# Patient Record
Sex: FEMALE | Race: WHITE | NOT HISPANIC OR LATINO | Employment: OTHER | URBAN - METROPOLITAN AREA
[De-identification: names, ages, dates, MRNs, and addresses within clinical notes are randomized per-mention and may not be internally consistent; named-entity substitution may affect disease eponyms.]

---

## 2017-01-11 ENCOUNTER — ANESTHESIA EVENT (OUTPATIENT)
Dept: GASTROENTEROLOGY | Facility: AMBULARY SURGERY CENTER | Age: 62
End: 2017-01-11
Payer: COMMERCIAL

## 2017-01-12 ENCOUNTER — HOSPITAL ENCOUNTER (OUTPATIENT)
Facility: AMBULARY SURGERY CENTER | Age: 62
Setting detail: OUTPATIENT SURGERY
Discharge: HOME | End: 2017-01-12
Attending: INTERNAL MEDICINE | Admitting: INTERNAL MEDICINE
Payer: COMMERCIAL

## 2017-01-12 ENCOUNTER — ANESTHESIA (OUTPATIENT)
Dept: GASTROENTEROLOGY | Facility: AMBULARY SURGERY CENTER | Age: 62
End: 2017-01-12
Payer: COMMERCIAL

## 2017-01-12 VITALS
OXYGEN SATURATION: 96 % | TEMPERATURE: 97.3 F | RESPIRATION RATE: 18 BRPM | HEART RATE: 61 BPM | SYSTOLIC BLOOD PRESSURE: 99 MMHG | DIASTOLIC BLOOD PRESSURE: 58 MMHG

## 2017-01-12 DIAGNOSIS — K21.9 GASTRO-ESOPHAGEAL REFLUX DISEASE WITHOUT ESOPHAGITIS: ICD-10-CM

## 2017-01-12 DIAGNOSIS — Z86.010 HISTORY OF COLONIC POLYPS: ICD-10-CM

## 2017-01-12 PROCEDURE — 88305 TISSUE EXAM BY PATHOLOGIST: CPT | Performed by: INTERNAL MEDICINE

## 2017-01-12 RX ORDER — PROPOFOL 10 MG/ML
INJECTION, EMULSION INTRAVENOUS AS NEEDED
Status: DISCONTINUED | OUTPATIENT
Start: 2017-01-12 | End: 2017-01-12 | Stop reason: SURG

## 2017-01-12 RX ORDER — SODIUM CHLORIDE, SODIUM LACTATE, POTASSIUM CHLORIDE, CALCIUM CHLORIDE 600; 310; 30; 20 MG/100ML; MG/100ML; MG/100ML; MG/100ML
125 INJECTION, SOLUTION INTRAVENOUS CONTINUOUS
Status: DISCONTINUED | OUTPATIENT
Start: 2017-01-12 | End: 2017-01-12 | Stop reason: HOSPADM

## 2017-01-12 RX ADMIN — PROPOFOL 25 MG: 10 INJECTION, EMULSION INTRAVENOUS at 08:19

## 2017-01-12 RX ADMIN — SODIUM CHLORIDE, SODIUM LACTATE, POTASSIUM CHLORIDE, AND CALCIUM CHLORIDE: .6; .31; .03; .02 INJECTION, SOLUTION INTRAVENOUS at 07:51

## 2017-01-12 RX ADMIN — SODIUM CHLORIDE, SODIUM LACTATE, POTASSIUM CHLORIDE, AND CALCIUM CHLORIDE 125 ML/HR: .6; .31; .03; .02 INJECTION, SOLUTION INTRAVENOUS at 07:29

## 2017-01-12 RX ADMIN — PROPOFOL 50 MG: 10 INJECTION, EMULSION INTRAVENOUS at 08:09

## 2017-01-12 RX ADMIN — PROPOFOL 25 MG: 10 INJECTION, EMULSION INTRAVENOUS at 08:26

## 2017-01-12 RX ADMIN — PROPOFOL 50 MG: 10 INJECTION, EMULSION INTRAVENOUS at 08:02

## 2018-12-31 ENCOUNTER — TRANSCRIBE ORDERS (OUTPATIENT)
Dept: ADMINISTRATIVE | Facility: HOSPITAL | Age: 63
End: 2018-12-31

## 2018-12-31 ENCOUNTER — HOSPITAL ENCOUNTER (OUTPATIENT)
Dept: RADIOLOGY | Facility: HOSPITAL | Age: 63
Discharge: HOME | End: 2018-12-31
Attending: FAMILY MEDICINE
Payer: COMMERCIAL

## 2018-12-31 ENCOUNTER — HOSPITAL ENCOUNTER (OUTPATIENT)
Dept: RADIOLOGY | Facility: HOSPITAL | Age: 63
Discharge: HOME | End: 2018-12-31
Payer: COMMERCIAL

## 2018-12-31 DIAGNOSIS — M25.50 PAIN IN JOINT, MULTIPLE SITES: Primary | ICD-10-CM

## 2018-12-31 DIAGNOSIS — M25.50 PAIN IN JOINT, MULTIPLE SITES: ICD-10-CM

## 2018-12-31 PROCEDURE — 73000 X-RAY EXAM OF COLLAR BONE: CPT

## 2019-01-11 ENCOUNTER — OFFICE VISIT (OUTPATIENT)
Dept: OBGYN CLINIC | Facility: CLINIC | Age: 64
End: 2019-01-11
Payer: COMMERCIAL

## 2019-01-11 VITALS
BODY MASS INDEX: 26.76 KG/M2 | HEIGHT: 62 IN | HEART RATE: 73 BPM | WEIGHT: 145.4 LBS | DIASTOLIC BLOOD PRESSURE: 64 MMHG | SYSTOLIC BLOOD PRESSURE: 100 MMHG

## 2019-01-11 DIAGNOSIS — S29.011A STRAIN OF RIGHT PECTORALIS MUSCLE, INITIAL ENCOUNTER: Primary | ICD-10-CM

## 2019-01-11 PROCEDURE — 99203 OFFICE O/P NEW LOW 30 MIN: CPT | Performed by: ORTHOPAEDIC SURGERY

## 2019-01-11 NOTE — PROGRESS NOTES
Assessment/Plan:  1  Strain of left pectoralis muscle, initial encounter     - reviewed with patient that she does not have any significant arthritic findings on her x-ray  - reviewed that this is musculature in nature, and she does not have any palpable masses to suggest further imaging needs  --- will consider MRI if she develops any masses  - patient can continue with full activities as tolerated  - recommend that she use OTC anti-inflammatory medications  - follow up PRN    Subjective:   Poly Sanchez is a 59 y o  female who presents for evaluation of right clavicle prominence and pain with certain movements  Patient states that the pain is more in the proximal area of the clavicle, and can be intermittently painful to palpation and can be painful with some shoulder ROM  Patient denies any trauma to the area  She denies any inciting injury/accident  Patient is unaware of how long this has been a problem  She has intermittent tingling down bilateral upper extremities, which is unchanged and not related to the pain  Patient denies any other complaints  Patient does a lot of physical activities and manual labor, including throwing hay jos  Review of Systems   Constitutional: Negative for fever and unexpected weight change  HENT: Negative for hearing loss, nosebleeds and sore throat  Eyes: Negative for pain, redness and visual disturbance  Respiratory: Negative for cough, shortness of breath and wheezing  Cardiovascular: Negative for chest pain, palpitations and leg swelling  Gastrointestinal: Negative for abdominal pain, nausea and vomiting  Endocrine: Negative for polydipsia and polyuria  Genitourinary: Negative for dysuria and hematuria  Skin: Negative for rash and wound  Neurological: Negative for dizziness and headaches  Psychiatric/Behavioral: Negative for agitation and suicidal ideas           Past Medical History:   Diagnosis Date    Acid reflux        Past Surgical History: Procedure Laterality Date    BREAST SURGERY  1975    lumpectomy, benign    COLONOSCOPY N/A 1/12/2017    Procedure: COLONOSCOPY;  Surgeon: Juan Miranda MD;  Location: [facility]; Service:     ESOPHAGOGASTRODUODENOSCOPY N/A 1/12/2017    Procedure: ESOPHAGOGASTRODUODENOSCOPY (EGD); Surgeon: Juan Miranda MD;  Location: [facility]; Service:     FRACTURE SURGERY      nose    GASTRIC BYPASS  2011    HYSTERECTOMY      JOINT REPLACEMENT Left     knee    ID CORRJ HALLUX VALGUS W/SESMDC W/DIST METAR OSTEOT Right 1/11/2016    Procedure: Tinnie Grebe with Internal Fixation Right Foot;  Surgeon: Helena Thomas DPM;  Location: [facility];  Service: Podiatry    ROOSEVELT-EN-Y PROCEDURE      TONSILLECTOMY         Family History   Problem Relation Age of Onset    COPD Mother     COPD Father     COPD Brother     Diabetes Maternal Grandmother     Cancer Paternal Grandmother         ovarian    No Known Problems Sister     No Known Problems Maternal Aunt     No Known Problems Maternal Uncle     No Known Problems Paternal Aunt     No Known Problems Paternal Uncle     No Known Problems Maternal Grandfather     No Known Problems Paternal Grandfather     ADD / ADHD Neg Hx     Anesthesia problems Neg Hx     Clotting disorder Neg Hx     Collagen disease Neg Hx     Dislocations Neg Hx     Learning disabilities Neg Hx     Neurological problems Neg Hx     Osteoporosis Neg Hx     Rheumatologic disease Neg Hx     Scoliosis Neg Hx     Vascular Disease Neg Hx        Social History     Occupational History    Not on file  Social History Main Topics    Smoking status: Never Smoker    Smokeless tobacco: Never Used    Alcohol use Yes      Comment: socially    Drug use: No    Sexual activity: Not on file         Current Outpatient Prescriptions:     CALCIUM PO, Take by mouth, Disp: , Rfl:     cyanocobalamin (VITAMIN B-12) 500 mcg tablet, Take 500 mcg by mouth daily  , Disp: , Rfl:    multivitamins-fortified A-D-K (SOURCECF) solution, Take 0 5 mL by mouth daily  , Disp: , Rfl:     VITAMIN D, ERGOCALCIFEROL, PO, Take by mouth, Disp: , Rfl:     Allergies   Allergen Reactions    Nsaids Itching    Oxycodone-Acetaminophen Itching    Celecoxib     Meloxicam        Objective:  Vitals:    01/11/19 1323   BP: 100/64   Pulse: 73       Left Shoulder Exam     Tenderness   The patient is experiencing no tenderness (no palpable masses)  Range of Motion   Active Abduction: 150   Passive Abduction: 170   Forward Flexion: normal   External Rotation: normal   Internal Rotation 0 degrees: Mid thoracic   Internal Rotation 90 degrees: normal     Muscle Strength   The patient has normal left shoulder strength  Tests   Drop Arm: negative  Impingement: negative    Other   Sensation: normal  Pulse: present     Comments:  Patient has discomfort with resisted adduction           Physical Exam   Constitutional: She is oriented to person, place, and time  She appears well-developed and well-nourished  HENT:   Head: Normocephalic and atraumatic  Eyes: Pupils are equal, round, and reactive to light  Neck: Neck supple  No tracheal deviation present  Cardiovascular: Intact distal pulses  Pulmonary/Chest: Breath sounds normal    Abdominal: There is no guarding  Neurological: She is alert and oriented to person, place, and time  Skin: Skin is warm and dry  No rash noted  Psychiatric: She has a normal mood and affect   Her behavior is normal        I have personally reviewed pertinent films in PACS and my interpretation is as follows:  Normal presentation of the right clavicle

## 2019-01-11 NOTE — LETTER
January 11, 2019     Starla Persaud MD  [facility] [id number]    Patient: Namita Cerna   YOB: 1955   Date of Visit: 1/11/2019       Dear Dr Josh Townsend: Thank you for referring Rosey Thompson to me for evaluation  Below are my notes for this consultation  If you have questions, please do not hesitate to call me  I look forward to following your patient along with you  Sincerely,        Starla Persaud MD        CC: DO Celestina Wright PA-C  1/11/2019  2:24 PM  Cosign Needed  Assessment/Plan:  1  Strain of left pectoralis muscle, initial encounter     - reviewed with patient that she does not have any significant arthritic findings on her x-ray  - reviewed that this is musculature in nature, and she does not have any palpable masses to suggest further imaging needs  --- will consider MRI if she develops any masses  - patient can continue with full activities as tolerated  - recommend that she use OTC anti-inflammatory medications  - follow up PRN    Subjective:   Namita Cerna is a 59 y o  female who presents for evaluation of right clavicle prominence and pain with certain movements  Patient states that the pain is more in the proximal area of the clavicle, and can be intermittently painful to palpation and can be painful with some shoulder ROM  Patient denies any trauma to the area  She denies any inciting injury/accident  Patient is unaware of how long this has been a problem  She has intermittent tingling down bilateral upper extremities, which is unchanged and not related to the pain  Patient denies any other complaints  Patient does a lot of physical activities and manual labor, including throwing hay jos  Review of Systems   Constitutional: Negative for fever and unexpected weight change  HENT: Negative for hearing loss, nosebleeds and sore throat  Eyes: Negative for pain, redness and visual disturbance  Respiratory: Negative for cough, shortness of breath and wheezing  Cardiovascular: Negative for chest pain, palpitations and leg swelling  Gastrointestinal: Negative for abdominal pain, nausea and vomiting  Endocrine: Negative for polydipsia and polyuria  Genitourinary: Negative for dysuria and hematuria  Skin: Negative for rash and wound  Neurological: Negative for dizziness and headaches  Psychiatric/Behavioral: Negative for agitation and suicidal ideas  Past Medical History:   Diagnosis Date    Acid reflux        Past Surgical History:   Procedure Laterality Date    BREAST SURGERY  1975    lumpectomy, benign    COLONOSCOPY N/A 1/12/2017    Procedure: COLONOSCOPY;  Surgeon: Jose Villegas MD;  Location: [facility]; Service:     ESOPHAGOGASTRODUODENOSCOPY N/A 1/12/2017    Procedure: ESOPHAGOGASTRODUODENOSCOPY (EGD); Surgeon: Jose Villegas MD;  Location: [facility];   Service:     FRACTURE SURGERY      nose    GASTRIC BYPASS  2011    HYSTERECTOMY      JOINT REPLACEMENT Left     knee    GA CORRJ HALLUX VALGUS W/SESMDC W/DIST METAR OSTEOT Right 1/11/2016    Procedure: Gabby Slipper with Internal Fixation Right Foot;  Surgeon: Pinky Gallagher DPM;  Location: [facility];  Service: Podiatry    ROOSEVELT-EN-Y PROCEDURE      TONSILLECTOMY         Family History   Problem Relation Age of Onset    COPD Mother     COPD Father     COPD Brother     Diabetes Maternal Grandmother     Cancer Paternal Grandmother         ovarian    No Known Problems Sister     No Known Problems Maternal Aunt     No Known Problems Maternal Uncle     No Known Problems Paternal Aunt     No Known Problems Paternal Uncle     No Known Problems Maternal Grandfather     No Known Problems Paternal Grandfather     ADD / ADHD Neg Hx     Anesthesia problems Neg Hx     Clotting disorder Neg Hx     Collagen disease Neg Hx     Dislocations Neg Hx     Learning disabilities Neg Hx     Neurological problems Neg Hx     Osteoporosis Neg Hx     Rheumatologic disease Neg Hx     Scoliosis Neg Hx     Vascular Disease Neg Hx        Social History     Occupational History    Not on file  Social History Main Topics    Smoking status: Never Smoker    Smokeless tobacco: Never Used    Alcohol use Yes      Comment: socially    Drug use: No    Sexual activity: Not on file         Current Outpatient Prescriptions:     CALCIUM PO, Take by mouth, Disp: , Rfl:     cyanocobalamin (VITAMIN B-12) 500 mcg tablet, Take 500 mcg by mouth daily  , Disp: , Rfl:     multivitamins-fortified A-D-K (SOURCECF) solution, Take 0 5 mL by mouth daily  , Disp: , Rfl:     VITAMIN D, ERGOCALCIFEROL, PO, Take by mouth, Disp: , Rfl:     Allergies   Allergen Reactions    Nsaids Itching    Oxycodone-Acetaminophen Itching    Celecoxib     Meloxicam        Objective:  Vitals:    01/11/19 1323   BP: 100/64   Pulse: 73       Left Shoulder Exam     Tenderness   The patient is experiencing no tenderness (no palpable masses)  Range of Motion   Active Abduction: 150   Passive Abduction: 170   Forward Flexion: normal   External Rotation: normal   Internal Rotation 0 degrees: Mid thoracic   Internal Rotation 90 degrees: normal     Muscle Strength   The patient has normal left shoulder strength  Tests   Drop Arm: negative  Impingement: negative    Other   Sensation: normal  Pulse: present     Comments:  Patient has discomfort with resisted adduction           Physical Exam   Constitutional: She is oriented to person, place, and time  She appears well-developed and well-nourished  HENT:   Head: Normocephalic and atraumatic  Eyes: Pupils are equal, round, and reactive to light  Neck: Neck supple  No tracheal deviation present  Cardiovascular: Intact distal pulses  Pulmonary/Chest: Breath sounds normal    Abdominal: There is no guarding  Neurological: She is alert and oriented to person, place, and time     Skin: Skin is warm and dry  No rash noted  Psychiatric: She has a normal mood and affect   Her behavior is normal        I have personally reviewed pertinent films in PACS and my interpretation is as follows:  Normal presentation of the right clavicle

## 2020-08-21 DIAGNOSIS — Z20.822 COVID-19 RULED OUT BY LABORATORY TESTING: ICD-10-CM

## 2020-08-21 PROCEDURE — U0003 INFECTIOUS AGENT DETECTION BY NUCLEIC ACID (DNA OR RNA); SEVERE ACUTE RESPIRATORY SYNDROME CORONAVIRUS 2 (SARS-COV-2) (CORONAVIRUS DISEASE [COVID-19]), AMPLIFIED PROBE TECHNIQUE, MAKING USE OF HIGH THROUGHPUT TECHNOLOGIES AS DESCRIBED BY CMS-2020-01-R: HCPCS | Performed by: INTERNAL MEDICINE

## 2020-08-22 LAB — SARS-COV-2 RNA SPEC QL NAA+PROBE: NOT DETECTED

## 2020-08-25 RX ORDER — FAMOTIDINE 20 MG/1
20 TABLET, FILM COATED ORAL DAILY PRN
COMMUNITY
End: 2022-05-27

## 2020-08-25 NOTE — PRE-PROCEDURE INSTRUCTIONS
Pre-Surgery Instructions:   Medication Instructions    CALCIUM PO Patient was instructed by Physician and understands   cyanocobalamin (VITAMIN B-12) 500 mcg tablet Patient was instructed by Physician and understands   famotidine (PEPCID) 20 mg tablet Patient was instructed by Physician and understands   multivitamins-fortified A-D-K (SOURCECF) solution Patient was instructed by Physician and understands   VITAMIN D, ERGOCALCIFEROL, PO Patient was instructed by Physician and understands

## 2020-08-26 ENCOUNTER — ANESTHESIA EVENT (OUTPATIENT)
Dept: GASTROENTEROLOGY | Facility: AMBULARY SURGERY CENTER | Age: 65
End: 2020-08-26

## 2020-08-26 NOTE — ANESTHESIA PREPROCEDURE EVALUATION
Procedure:  COLONOSCOPY    Relevant Problems   No relevant active problems        Physical Exam    Airway    Mallampati score: II  TM Distance: >3 FB  Neck ROM: full     Dental   No notable dental hx     Cardiovascular  Rhythm: regular, Rate: normal, Cardiovascular exam normal    Pulmonary  Pulmonary exam normal Breath sounds clear to auscultation,     Other Findings        Anesthesia Plan  ASA Score- 2     Anesthesia Type- IV sedation with anesthesia with ASA Monitors  Additional Monitors:   Airway Plan:           Plan Factors-Exercise tolerance (METS): >4 METS  Chart reviewed  Patient is not a current smoker  Induction- intravenous  Postoperative Plan- Plan for postoperative opioid use  Informed Consent- Anesthetic plan and risks discussed with patient  I personally reviewed this patient with the CRNA  Discussed and agreed on the Anesthesia Plan with the CRNA  Jose Angel Keith

## 2020-08-27 ENCOUNTER — HOSPITAL ENCOUNTER (OUTPATIENT)
Dept: GASTROENTEROLOGY | Facility: AMBULARY SURGERY CENTER | Age: 65
Setting detail: OUTPATIENT SURGERY
Discharge: HOME | End: 2020-08-27
Attending: INTERNAL MEDICINE | Admitting: INTERNAL MEDICINE
Payer: COMMERCIAL

## 2020-08-27 ENCOUNTER — ANESTHESIA (OUTPATIENT)
Dept: GASTROENTEROLOGY | Facility: AMBULARY SURGERY CENTER | Age: 65
End: 2020-08-27

## 2020-08-27 VITALS
HEIGHT: 62 IN | RESPIRATION RATE: 16 BRPM | OXYGEN SATURATION: 100 % | TEMPERATURE: 97.6 F | HEART RATE: 79 BPM | DIASTOLIC BLOOD PRESSURE: 56 MMHG | WEIGHT: 145 LBS | BODY MASS INDEX: 26.68 KG/M2 | SYSTOLIC BLOOD PRESSURE: 108 MMHG

## 2020-08-27 DIAGNOSIS — Z20.822 COVID-19 RULED OUT BY LABORATORY TESTING: Primary | ICD-10-CM

## 2020-08-27 DIAGNOSIS — Z86.010 PERSONAL HISTORY OF COLONIC POLYPS: ICD-10-CM

## 2020-08-27 PROCEDURE — 88305 TISSUE EXAM BY PATHOLOGIST: CPT | Performed by: PATHOLOGY

## 2020-08-27 PROCEDURE — 87635 SARS-COV-2 COVID-19 AMP PRB: CPT

## 2020-08-27 RX ORDER — SODIUM CHLORIDE, SODIUM LACTATE, POTASSIUM CHLORIDE, CALCIUM CHLORIDE 600; 310; 30; 20 MG/100ML; MG/100ML; MG/100ML; MG/100ML
125 INJECTION, SOLUTION INTRAVENOUS CONTINUOUS
Status: DISCONTINUED | OUTPATIENT
Start: 2020-08-27 | End: 2020-08-31 | Stop reason: HOSPADM

## 2020-08-27 RX ORDER — PROPOFOL 10 MG/ML
INJECTION, EMULSION INTRAVENOUS AS NEEDED
Status: DISCONTINUED | OUTPATIENT
Start: 2020-08-27 | End: 2020-08-27

## 2020-08-27 RX ADMIN — PROPOFOL 100 MG: 10 INJECTION, EMULSION INTRAVENOUS at 07:43

## 2020-08-27 RX ADMIN — SODIUM CHLORIDE, SODIUM LACTATE, POTASSIUM CHLORIDE, AND CALCIUM CHLORIDE 125 ML/HR: .6; .31; .03; .02 INJECTION, SOLUTION INTRAVENOUS at 07:38

## 2020-08-27 RX ADMIN — PROPOFOL 100 MG: 10 INJECTION, EMULSION INTRAVENOUS at 07:45

## 2020-08-27 RX ADMIN — PROPOFOL 100 MG: 10 INJECTION, EMULSION INTRAVENOUS at 07:47

## 2020-08-27 RX ADMIN — PROPOFOL 100 MG: 10 INJECTION, EMULSION INTRAVENOUS at 07:55

## 2020-08-27 RX ADMIN — PROPOFOL 150 MG: 10 INJECTION, EMULSION INTRAVENOUS at 07:50

## 2020-08-27 NOTE — DISCHARGE SUMMARY
Call my office for biopsy results in 2 weeks  Repeat colonoscopy in 10 years for colon cancer screening    If any GI symptoms then evaluation accordingly

## 2020-08-27 NOTE — ANESTHESIA POSTPROCEDURE EVALUATION
Post-Op Assessment Note    CV Status:  Stable       Mental Status:  Sleepy   Hydration Status:  Stable   PONV Controlled:  Controlled   Airway Patency:  Patent      Post Op Vitals Reviewed: Yes      Staff: CRNA         No complications documented      BP   109/56   Temp      Pulse  78   Resp      SpO2   100

## 2020-08-27 NOTE — PERIOPERATIVE NURSING NOTE
Pt d/c to home at this time [place]   Via: Walking  Pt left with all belongings  Iv was D/C intact with dry sterile dressing  Encouraged to keep follow up appointments, Verbalized understanding  D/C instructions reviewed and explained  Verbalized understanding

## 2020-08-27 NOTE — H&P
History and Physical - [facility]  Shyann Lovett 62 y o  female MRN: [id number]                  HPI: Shyann Lovett is a 62y o  year old female who presents for open access colonoscopy for follow-up of polyps last colonoscopy more than 3 years ago      REVIEW OF SYSTEMS: Per the HPI, and otherwise unremarkable  Historical Information   Past Medical History:   Diagnosis Date    Acid reflux     GERD (gastroesophageal reflux disease)      Past Surgical History:   Procedure Laterality Date    BREAST SURGERY Left 1975    lumpectomy, benign    COLONOSCOPY N/A 1/12/2017    Procedure: COLONOSCOPY;  Surgeon: Cherie Carballo MD;  Location: [place] [address] GI LAB; Service:     ESOPHAGOGASTRODUODENOSCOPY N/A 1/12/2017    Procedure: ESOPHAGOGASTRODUODENOSCOPY (EGD); Surgeon: Cherie Carballo MD;  Location: [place] [address] GI LAB;   Service:     FRACTURE SURGERY      nose    GASTRIC BYPASS  2011    HYSTERECTOMY      JOINT REPLACEMENT Left     knee    UT CORRJ HALLUX VALGUS W/SESMDC W/DIST METAR OSTEOT Right 1/11/2016    Procedure: Allison Wilson with Internal Fixation Right Foot;  Surgeon: Shannan Brooke DPM;  Location: [facility] OR;  Service: Podiatry    ROOSEVELT-EN-Y PROCEDURE      TONSILLECTOMY       Social History   Social History     Substance and Sexual Activity   Alcohol Use Yes    Frequency: 2-4 times a month    Comment: socially     Social History     Substance and Sexual Activity   Drug Use No     Social History     Tobacco Use   Smoking Status Never Smoker   Smokeless Tobacco Never Used     Family History   Problem Relation Age of Onset    COPD Mother     COPD Father     COPD Brother     Diabetes Maternal Grandmother     Cancer Paternal Grandmother         ovarian    No Known Problems Sister     No Known Problems Maternal Aunt     No Known Problems Maternal Uncle     No Known Problems Paternal Aunt     No Known Problems Paternal Uncle     No Known Problems Maternal Grandfather     No Known Problems Paternal Grandfather     ADD / ADHD Neg Hx     Anesthesia problems Neg Hx     Clotting disorder Neg Hx     Collagen disease Neg Hx     Dislocations Neg Hx     Learning disabilities Neg Hx     Neurological problems Neg Hx     Osteoporosis Neg Hx     Rheumatologic disease Neg Hx     Scoliosis Neg Hx     Vascular Disease Neg Hx        Meds/Allergies     (Not in a hospital admission)      Allergies   Allergen Reactions    Nsaids Itching    Oxycodone-Acetaminophen Itching    Celecoxib     Meloxicam        Objective     /76   Pulse 73   Temp 97 6 °F (36 4 °C) (Tympanic)   Resp 18   Ht 5' 1 75" (1 568 m)   Wt 65 8 kg (145 lb)   SpO2 99%   BMI 26 74 kg/m²       PHYSICAL EXAM    Gen: NAD  CV: RRR  CHEST: Clear  ABD: soft, NT/ND  EXT: no edema      ASSESSMENT/PLAN:  This is a 62y o  year old female here for follow-up of polyps, last colonoscopy more than 3 years ago and she is stable and optimized for her procedure

## 2020-09-21 ENCOUNTER — HOSPITAL ENCOUNTER (OUTPATIENT)
Dept: RADIOLOGY | Facility: HOSPITAL | Age: 65
Discharge: HOME | End: 2020-09-21
Attending: FAMILY MEDICINE
Payer: COMMERCIAL

## 2020-09-21 ENCOUNTER — TRANSCRIBE ORDERS (OUTPATIENT)
Dept: ADMINISTRATIVE | Facility: HOSPITAL | Age: 65
End: 2020-09-21

## 2020-09-21 DIAGNOSIS — M54.2 CERVICALGIA: Primary | ICD-10-CM

## 2020-09-21 DIAGNOSIS — M54.2 CERVICALGIA: ICD-10-CM

## 2020-09-21 PROCEDURE — 72040 X-RAY EXAM NECK SPINE 2-3 VW: CPT

## 2020-11-18 ENCOUNTER — TELEPHONE (OUTPATIENT)
Dept: OBGYN CLINIC | Facility: HOSPITAL | Age: 65
End: 2020-11-18

## 2020-11-20 ENCOUNTER — APPOINTMENT (EMERGENCY)
Dept: RADIOLOGY | Facility: HOSPITAL | Age: 65
End: 2020-11-20
Payer: COMMERCIAL

## 2020-11-20 ENCOUNTER — HOSPITAL ENCOUNTER (EMERGENCY)
Facility: HOSPITAL | Age: 65
Discharge: HOME | End: 2020-11-20
Attending: EMERGENCY MEDICINE | Admitting: EMERGENCY MEDICINE
Payer: COMMERCIAL

## 2020-11-20 VITALS
SYSTOLIC BLOOD PRESSURE: 124 MMHG | OXYGEN SATURATION: 99 % | RESPIRATION RATE: 16 BRPM | DIASTOLIC BLOOD PRESSURE: 61 MMHG | TEMPERATURE: 98.6 F | HEART RATE: 84 BPM

## 2020-11-20 DIAGNOSIS — M25.775 OSTEOPHYTE OF LEFT FOOT: ICD-10-CM

## 2020-11-20 DIAGNOSIS — L03.119 CELLULITIS OF FOOT: Primary | ICD-10-CM

## 2020-11-20 PROCEDURE — 99284 EMERGENCY DEPT VISIT MOD MDM: CPT | Performed by: PHYSICIAN ASSISTANT

## 2020-11-20 PROCEDURE — 73630 X-RAY EXAM OF FOOT: CPT

## 2020-11-20 PROCEDURE — 99283 EMERGENCY DEPT VISIT LOW MDM: CPT

## 2020-11-20 RX ORDER — CEPHALEXIN 500 MG/1
500 CAPSULE ORAL 2 TIMES DAILY
Qty: 20 CAPSULE | Refills: 0 | Status: SHIPPED | OUTPATIENT
Start: 2020-11-20 | End: 2020-11-30

## 2021-08-10 ENCOUNTER — OFFICE VISIT (OUTPATIENT)
Dept: OBGYN CLINIC | Facility: CLINIC | Age: 66
End: 2021-08-10
Payer: COMMERCIAL

## 2021-08-10 VITALS
HEART RATE: 73 BPM | BODY MASS INDEX: 29 KG/M2 | SYSTOLIC BLOOD PRESSURE: 117 MMHG | DIASTOLIC BLOOD PRESSURE: 73 MMHG | WEIGHT: 153.6 LBS | HEIGHT: 61 IN

## 2021-08-10 DIAGNOSIS — G56.03 CARPAL TUNNEL SYNDROME, BILATERAL: ICD-10-CM

## 2021-08-10 DIAGNOSIS — M65.311 TRIGGER THUMB OF RIGHT HAND: Primary | ICD-10-CM

## 2021-08-10 PROCEDURE — 99213 OFFICE O/P EST LOW 20 MIN: CPT | Performed by: ORTHOPAEDIC SURGERY

## 2021-08-10 PROCEDURE — 20550 NJX 1 TENDON SHEATH/LIGAMENT: CPT | Performed by: ORTHOPAEDIC SURGERY

## 2021-08-10 RX ORDER — LIDOCAINE HYDROCHLORIDE 10 MG/ML
0.5 INJECTION, SOLUTION INFILTRATION; PERINEURAL
Status: COMPLETED | OUTPATIENT
Start: 2021-08-10 | End: 2021-08-10

## 2021-08-10 RX ORDER — TRIAMCINOLONE ACETONIDE 40 MG/ML
20 INJECTION, SUSPENSION INTRA-ARTICULAR; INTRAMUSCULAR
Status: COMPLETED | OUTPATIENT
Start: 2021-08-10 | End: 2021-08-10

## 2021-08-10 RX ADMIN — LIDOCAINE HYDROCHLORIDE 0.5 ML: 10 INJECTION, SOLUTION INFILTRATION; PERINEURAL at 08:14

## 2021-08-10 RX ADMIN — TRIAMCINOLONE ACETONIDE 20 MG: 40 INJECTION, SUSPENSION INTRA-ARTICULAR; INTRAMUSCULAR at 08:14

## 2021-08-10 NOTE — PROGRESS NOTES
Assessment/Plan:  1  Trigger thumb of right hand     2  Carpal tunnel syndrome, bilateral         Scribe Attestation    I,:  Layo Horvath am acting as a scribe while in the presence of the attending physician :       I,:  Marva Rivera, DO personally performed the services described in this documentation    as scribed in my presence :         Nelson Rodarte is a pleasant 60year old who presents to the office today for an initial evaluation of her right thumb  Upon evaluation today she has findings consistent with right thumb trigger finger and bilateral carpal tunnel syndrome  The patient notes that the numbness and tingling into her hands is not as bothersome as the pain into her right thumb  I discussed with the patient we will continue to monitor the numbness and tingling she is experiencing into her bilateral hands  Non-operative treatments were discussed with the patient in the forms of a corticosteroid injection for the trigger thumb  We discussed the risks and benefits of corticosteroid injection today in the office and she did elect to proceed  The right thumb A1 pulley injection was administered without issue and well tolerated by the patient  Post injection instructions were provided  She understands can be repeated no sooner than three months  I will follow-up with her in 3 months for a clinical re-evaluation  She understood and had no further questions  Subjective:   Patient ID: Hortensia Valentino is a 60 y o  female who presents to the office today for an initial evaluation of her right thumb  She states in April 2021 she noticed pain and locking into her right thumb with no specific mechanism of injury  She states that she experiences constant sharp pain into her right thumb  She states that she notes that her thumb will lock at night  She states that she does experience bilateral numbness and tingling into both her hands  She states that the numbness and tingling is not to bothersome      Review of Systems Constitutional: Negative for chills, fever and unexpected weight change  HENT: Negative for hearing loss, nosebleeds and sore throat  Eyes: Negative for pain, redness and visual disturbance  Respiratory: Negative for cough, shortness of breath and wheezing  Cardiovascular: Negative for chest pain, palpitations and leg swelling  Gastrointestinal: Negative for abdominal pain, nausea and vomiting  Endocrine: Negative for polyphagia and polyuria  Genitourinary: Negative for dysuria and hematuria  Musculoskeletal: Negative for arthralgias, gait problem and joint swelling  Skin: Negative for rash and wound  Neurological: Negative for dizziness, numbness and headaches  Psychiatric/Behavioral: Negative for decreased concentration and suicidal ideas  The patient is not nervous/anxious  Past Medical History:   Diagnosis Date    Acid reflux     GERD (gastroesophageal reflux disease)        Past Surgical History:   Procedure Laterality Date    BREAST SURGERY Left 1975    lumpectomy, benign    COLONOSCOPY N/A 1/12/2017    Procedure: COLONOSCOPY;  Surgeon: Linda Cooper MD;  Location: [facility]; Service:     ESOPHAGOGASTRODUODENOSCOPY N/A 1/12/2017    Procedure: ESOPHAGOGASTRODUODENOSCOPY (EGD); Surgeon: Linda Cooper MD;  Location: [facility];   Service:     FRACTURE SURGERY      nose    GASTRIC BYPASS  2011    HYSTERECTOMY      JOINT REPLACEMENT Left     knee    DE CORRJ HALLUX VALGUS W/SESMDC W/DIST Bertrum Benes Right 1/11/2016    Procedure: Cee Pleas with Internal Fixation Right Foot;  Surgeon: Vinnie Cheema DPM;  Location: [facility];  Service: Podiatry    ROOSEVELT-EN-Y PROCEDURE      TONSILLECTOMY         Family History   Problem Relation Age of Onset    COPD Mother     COPD Father     COPD Brother     Diabetes Maternal Grandmother     Cancer Paternal Grandmother         ovarian    No Known Problems Sister     No Known Problems Maternal Aunt     No Known Problems Maternal Uncle     No Known Problems Paternal Aunt     No Known Problems Paternal Uncle     No Known Problems Maternal Grandfather     No Known Problems Paternal Grandfather     ADD / ADHD Neg Hx     Anesthesia problems Neg Hx     Clotting disorder Neg Hx     Collagen disease Neg Hx     Dislocations Neg Hx     Learning disabilities Neg Hx     Neurological problems Neg Hx     Osteoporosis Neg Hx     Rheumatologic disease Neg Hx     Scoliosis Neg Hx     Vascular Disease Neg Hx        Social History     Occupational History    Not on file   Tobacco Use    Smoking status: Never Smoker    Smokeless tobacco: Never Used   Vaping Use    Vaping Use: Never used   Substance and Sexual Activity    Alcohol use: Yes     Comment: socially    Drug use: No    Sexual activity: Not on file         Current Outpatient Medications:     CALCIUM PO, Take by mouth, Disp: , Rfl:     cyanocobalamin (VITAMIN B-12) 500 mcg tablet, Take 500 mcg by mouth daily  , Disp: , Rfl:     multivitamins-fortified A-D-K (SOURCECF) solution, Take 0 5 mL by mouth daily  , Disp: , Rfl:     VITAMIN D, ERGOCALCIFEROL, PO, Take by mouth, Disp: , Rfl:     famotidine (PEPCID) 20 mg tablet, Take 20 mg by mouth daily as needed for heartburn (Patient not taking: Reported on 8/10/2021), Disp: , Rfl:     Allergies   Allergen Reactions    Nsaids Itching    Oxycodone-Acetaminophen Itching    Celecoxib     Meloxicam     Adhesive [Medical Tape] Rash       Objective:  Vitals:    08/10/21 0758   BP: 117/73   Pulse: 73       Ortho Exam   Right Thumb  TTP A1 pulley  Obvious Triggering   1 cm mass volar aspect of wrist likely a cyst  + Tinel's at guyon's canal  Compartments soft  Brisk capillary refill  S/m intact median, radial, and ulnar nerve     Left Wrist  - Tinel's at guyon's canal  + Durkan's  at guyon's canal  Compartments soft  Brisk capillary refill  S/m intact median, radial, and ulnar nerve     Physical Exam  Vitals reviewed  Constitutional:       Appearance: Normal appearance  She is well-developed  HENT:      Head: Normocephalic and atraumatic  Eyes:      General:         Right eye: No discharge  Left eye: No discharge  Extraocular Movements: Extraocular movements intact  Conjunctiva/sclera: Conjunctivae normal    Cardiovascular:      Rate and Rhythm: Normal rate  Pulmonary:      Effort: Pulmonary effort is normal  No respiratory distress  Musculoskeletal:      Cervical back: Normal range of motion and neck supple  Skin:     General: Skin is warm and dry  Neurological:      General: No focal deficit present  Mental Status: She is alert and oriented to person, place, and time  Psychiatric:         Mood and Affect: Mood normal          Behavior: Behavior normal      Hand/upper extremity injection: R thumb A1  Universal Protocol:  Consent: Verbal consent obtained  Risks and benefits: risks, benefits and alternatives were discussed  Consent given by: patient  Site marked: the operative site was marked  Supporting Documentation  Indications: pain and tendon swelling   Procedure Details  Condition:trigger finger Location: thumb - R thumb A1   Preparation: Patient was prepped and draped in the usual sterile fashion  Needle size: 27 G  Ultrasound guidance: no  Approach: volar  Medications administered: 0 5 mL lidocaine 1 %; 20 mg triamcinolone acetonide 40 mg/mL    Patient tolerance: patient tolerated the procedure well with no immediate complications  Dressing:  Sterile dressing applied             I have personally reviewed pertinent films in PACS and my interpretation is as follows: no new images reviewed today

## 2022-04-05 ENCOUNTER — APPOINTMENT (OUTPATIENT)
Dept: RADIOLOGY | Facility: CLINIC | Age: 67
End: 2022-04-05
Payer: COMMERCIAL

## 2022-04-05 DIAGNOSIS — M54.2 CERVICALGIA: ICD-10-CM

## 2022-04-05 PROCEDURE — 72040 X-RAY EXAM NECK SPINE 2-3 VW: CPT

## 2022-05-27 ENCOUNTER — HOSPITAL ENCOUNTER (EMERGENCY)
Facility: HOSPITAL | Age: 67
Discharge: HOME | End: 2022-05-27
Attending: EMERGENCY MEDICINE | Admitting: EMERGENCY MEDICINE
Payer: COMMERCIAL

## 2022-05-27 VITALS
HEART RATE: 92 BPM | OXYGEN SATURATION: 97 % | RESPIRATION RATE: 18 BRPM | DIASTOLIC BLOOD PRESSURE: 70 MMHG | TEMPERATURE: 97.8 F | WEIGHT: 150 LBS | BODY MASS INDEX: 28.34 KG/M2 | SYSTOLIC BLOOD PRESSURE: 120 MMHG

## 2022-05-27 DIAGNOSIS — M54.50 ACUTE RIGHT-SIDED LOW BACK PAIN WITHOUT SCIATICA: Primary | ICD-10-CM

## 2022-05-27 PROCEDURE — 99284 EMERGENCY DEPT VISIT MOD MDM: CPT | Performed by: EMERGENCY MEDICINE

## 2022-05-27 PROCEDURE — 99283 EMERGENCY DEPT VISIT LOW MDM: CPT

## 2022-05-27 RX ORDER — METHOCARBAMOL 500 MG/1
500 TABLET, FILM COATED ORAL 2 TIMES DAILY
Qty: 20 TABLET | Refills: 0 | Status: SHIPPED | OUTPATIENT
Start: 2022-05-27

## 2022-05-27 RX ORDER — IBUPROFEN 800 MG/1
800 TABLET ORAL EVERY 6 HOURS PRN
COMMUNITY

## 2022-05-27 NOTE — Clinical Note
Viki Fernandez was seen and treated in our emergency department on 5/27/2022  Diagnosis:     Stephanie Aguero  may return to work on return date  She may return on this date: 05/31/2022         If you have any questions or concerns, please don't hesitate to call        Shona Crow, DO    ______________________________           _______________          _______________  Hospital Representative                              Date                                Time

## 2022-05-28 NOTE — ED PROVIDER NOTES
History  Chief Complaint   Patient presents with    Back Pain     Co of mid back pain that now radiates and sit on R side  Spasm reported to the area  Worsening for about a week  71-year-old female complaining of midback pain radiates down the right side she states that she has been having some spasm in her low back which she occasionally gets did take Flexeril was seems to relieve that a little bit but now she still having some pain in the right side  No neurovascular deficits no weakness no bowel bladder dysfunction  Patient does have history of similar in the past       History provided by:  Patient   used: No        Prior to Admission Medications   Prescriptions Last Dose Informant Patient Reported? Taking? CALCIUM PO   Yes No   Sig: Take by mouth   VITAMIN D, ERGOCALCIFEROL, PO   Yes No   Sig: Take by mouth   cyanocobalamin (VITAMIN B-12) 500 mcg tablet   Yes No   Sig: Take 500 mcg by mouth daily  ibuprofen (MOTRIN) 800 mg tablet 5/26/2022 at 2000 Self Yes Yes   Sig: Take 800 mg by mouth every 6 (six) hours as needed for mild pain   multivitamins-fortified A-D-K (SOURCECF) solution   Yes No   Sig: Take 0 5 mL by mouth daily  Facility-Administered Medications: None       Past Medical History:   Diagnosis Date    Acid reflux     GERD (gastroesophageal reflux disease)        Past Surgical History:   Procedure Laterality Date    BREAST SURGERY Left 1975    lumpectomy, benign    COLONOSCOPY N/A 1/12/2017    Procedure: COLONOSCOPY;  Surgeon: Gonzalez Cardenas MD;  Location: [facility]; Service:     ESOPHAGOGASTRODUODENOSCOPY N/A 1/12/2017    Procedure: ESOPHAGOGASTRODUODENOSCOPY (EGD); Surgeon: Gonzalez Cardenas MD;  Location: [facility];   Service:     FRACTURE SURGERY      nose    GASTRIC BYPASS  2011    HYSTERECTOMY      JOINT REPLACEMENT Left     knee    ID CORRJ HALLUX VALGUS W/SESMDC W/DIST Karen Began Right 1/11/2016    Procedure: Buster Dickinson with Internal Fixation Right Foot;  Surgeon: Alana Castañeda DPM;  Location: [facility];  Service: Podiatry    ROOSEVELT-EN-Y PROCEDURE      TONSILLECTOMY         Family History   Problem Relation Age of Onset    COPD Mother     COPD Father     COPD Brother     Diabetes Maternal Grandmother     Cancer Paternal Grandmother         ovarian    No Known Problems Sister     No Known Problems Maternal Aunt     No Known Problems Maternal Uncle     No Known Problems Paternal Aunt     No Known Problems Paternal Uncle     No Known Problems Maternal Grandfather     No Known Problems Paternal Grandfather     ADD / ADHD Neg Hx     Anesthesia problems Neg Hx     Clotting disorder Neg Hx     Collagen disease Neg Hx     Dislocations Neg Hx     Learning disabilities Neg Hx     Neurological problems Neg Hx     Osteoporosis Neg Hx     Rheumatologic disease Neg Hx     Scoliosis Neg Hx     Vascular Disease Neg Hx      I have reviewed and agree with the history as documented  E-Cigarette/Vaping    E-Cigarette Use Never User      E-Cigarette/Vaping Substances     Social History     Tobacco Use    Smoking status: Never Smoker    Smokeless tobacco: Never Used   Vaping Use    Vaping Use: Never used   Substance Use Topics    Alcohol use: Yes     Comment: socially    Drug use: No       Review of Systems   Constitutional: Negative for activity change, chills, diaphoresis and fever  HENT: Negative for congestion, ear pain, nosebleeds, sore throat, trouble swallowing and voice change  Eyes: Negative for pain, discharge and redness  Respiratory: Negative for apnea, cough, choking, shortness of breath, wheezing and stridor  Cardiovascular: Negative for chest pain and palpitations  Gastrointestinal: Negative for abdominal distention, abdominal pain, constipation, diarrhea, nausea and vomiting  Endocrine: Negative for polydipsia     Genitourinary: Negative for difficulty urinating, dysuria, flank pain, frequency, hematuria and urgency  Musculoskeletal: Positive for back pain  Negative for gait problem, joint swelling, myalgias, neck pain and neck stiffness  Skin: Negative for pallor and rash  Neurological: Negative for dizziness, tremors, syncope, speech difficulty, weakness, numbness and headaches  Hematological: Negative for adenopathy  Psychiatric/Behavioral: Negative for confusion, hallucinations, self-injury and suicidal ideas  The patient is not nervous/anxious  Physical Exam  Physical Exam  Vitals and nursing note reviewed  Constitutional:       General: She is not in acute distress  Appearance: She is well-developed  She is not diaphoretic  HENT:      Head: Normocephalic and atraumatic  Right Ear: External ear normal       Left Ear: External ear normal       Nose: Nose normal    Eyes:      Conjunctiva/sclera: Conjunctivae normal       Pupils: Pupils are equal, round, and reactive to light  Cardiovascular:      Rate and Rhythm: Normal rate and regular rhythm  Heart sounds: Normal heart sounds  Pulmonary:      Effort: Pulmonary effort is normal       Breath sounds: Normal breath sounds  Abdominal:      General: Bowel sounds are normal       Palpations: Abdomen is soft  Musculoskeletal:         General: Normal range of motion  Cervical back: Normal range of motion and neck supple  Comments: Tenderness in the low right paravertebral region on palpation   Skin:     General: Skin is warm and dry  Neurological:      Mental Status: She is alert and oriented to person, place, and time  Deep Tendon Reflexes: Reflexes are normal and symmetric  Psychiatric:         Behavior: Behavior is cooperative           Vital Signs  ED Triage Vitals [05/27/22 1521]   Temperature Pulse Respirations Blood Pressure SpO2   97 8 °F (36 6 °C) 92 18 120/70 97 %      Temp Source Heart Rate Source Patient Position - Orthostatic VS BP Location FiO2 (%)   Tympanic Monitor Sitting Right arm --      Pain Score       7           Vitals:    05/27/22 1521   BP: 120/70   Pulse: 92   Patient Position - Orthostatic VS: Sitting         Visual Acuity      ED Medications  Medications - No data to display    Diagnostic Studies  Results Reviewed     None                 No orders to display              Procedures  Procedures         ED Course                               SBIRT 22yo+    Flowsheet Row Most Recent Value   SBIRT (25 yo +)    In order to provide better care to our patients, we are screening all of our patients for alcohol and drug use  Would it be okay to ask you these screening questions? No Filed at: 05/27/2022 1614                    MDM    Disposition  Final diagnoses:   Acute right-sided low back pain without sciatica     Time reflects when diagnosis was documented in both MDM as applicable and the Disposition within this note     Time User Action Codes Description Comment    5/27/2022  4:19 PM Sharmaine Miranda Add [M54 50] Acute right-sided low back pain without sciatica       ED Disposition     ED Disposition   Discharge    Condition   Stable    Date/Time   Fri May 27, 2022  4:19 PM    Comment   Ashley Sober discharge to home/self care                 Follow-up Information     Follow up With Specialties Details Why Contact Info    Ledy Corona DO Family Medicine Schedule an appointment as soon as possible for a visit  As needed [address] [facility]  [phone]            Discharge Medication List as of 5/27/2022  4:20 PM      START taking these medications    Details   methocarbamol (ROBAXIN) 500 mg tablet Take 1 tablet (500 mg total) by mouth 2 (two) times a day, Starting Fri 5/27/2022, Normal         CONTINUE these medications which have NOT CHANGED    Details   ibuprofen (MOTRIN) 800 mg tablet Take 800 mg by mouth every 6 (six) hours as needed for mild pain, Historical Med      CALCIUM PO Take by mouth, Historical Med      cyanocobalamin (VITAMIN B-12) 500 mcg tablet Take 500 mcg by mouth daily  , Until Discontinued, Historical Med      multivitamins-fortified A-D-K (SOURCECF) solution Take 0 5 mL by mouth daily  , Until Discontinued, Historical Med      VITAMIN D, ERGOCALCIFEROL, PO Take by mouth, Historical Med             No discharge procedures on file      PDMP Review     None          ED Provider  Electronically Signed by           Kalee Henry DO  05/27/22 7809

## 2022-06-01 ENCOUNTER — APPOINTMENT (OUTPATIENT)
Dept: RADIOLOGY | Facility: CLINIC | Age: 67
End: 2022-06-01
Payer: COMMERCIAL

## 2022-06-01 DIAGNOSIS — M54.50 LOW BACK PAIN, UNSPECIFIED BACK PAIN LATERALITY, UNSPECIFIED CHRONICITY, UNSPECIFIED WHETHER SCIATICA PRESENT: ICD-10-CM

## 2022-06-01 PROCEDURE — 72100 X-RAY EXAM L-S SPINE 2/3 VWS: CPT

## 2022-07-11 ENCOUNTER — APPOINTMENT (EMERGENCY)
Dept: RADIOLOGY | Facility: HOSPITAL | Age: 67
End: 2022-07-11
Payer: COMMERCIAL

## 2022-07-11 ENCOUNTER — HOSPITAL ENCOUNTER (OUTPATIENT)
Facility: HOSPITAL | Age: 67
Setting detail: OUTPATIENT SURGERY
Discharge: HOME | End: 2022-07-12
Attending: EMERGENCY MEDICINE | Admitting: SURGERY
Payer: COMMERCIAL

## 2022-07-11 ENCOUNTER — ANESTHESIA EVENT (OUTPATIENT)
Dept: PERIOP | Facility: HOSPITAL | Age: 67
End: 2022-07-11
Payer: COMMERCIAL

## 2022-07-11 ENCOUNTER — ANESTHESIA (OUTPATIENT)
Dept: PERIOP | Facility: HOSPITAL | Age: 67
End: 2022-07-11
Payer: COMMERCIAL

## 2022-07-11 DIAGNOSIS — Z90.49 S/P APPENDECTOMY: ICD-10-CM

## 2022-07-11 DIAGNOSIS — K35.80 ACUTE APPENDICITIS: ICD-10-CM

## 2022-07-11 DIAGNOSIS — R10.9 ABDOMINAL PAIN: Primary | ICD-10-CM

## 2022-07-11 PROBLEM — K21.9 GASTROESOPHAGEAL REFLUX DISEASE: Status: ACTIVE | Noted: 2022-07-11

## 2022-07-11 LAB
ALBUMIN SERPL BCP-MCNC: 4.1 G/DL (ref 3.5–5)
ALP SERPL-CCNC: 122 U/L (ref 46–116)
ALT SERPL W P-5'-P-CCNC: 26 U/L (ref 12–78)
ANION GAP SERPL CALCULATED.3IONS-SCNC: 11 MMOL/L (ref 4–13)
APTT PPP: 28 SECONDS (ref 23–37)
AST SERPL W P-5'-P-CCNC: 23 U/L (ref 5–45)
BACTERIA UR QL AUTO: ABNORMAL /HPF
BASOPHILS # BLD AUTO: 0.04 THOUSANDS/ΜL (ref 0–0.1)
BASOPHILS NFR BLD AUTO: 0 % (ref 0–1)
BILIRUB SERPL-MCNC: 1.27 MG/DL (ref 0.2–1)
BILIRUB UR QL STRIP: NEGATIVE
BUN SERPL-MCNC: 11 MG/DL (ref 5–25)
CALCIUM SERPL-MCNC: 9.3 MG/DL (ref 8.3–10.1)
CHLORIDE SERPL-SCNC: 102 MMOL/L (ref 100–108)
CLARITY UR: ABNORMAL
CO2 SERPL-SCNC: 25 MMOL/L (ref 21–32)
COLOR UR: YELLOW
CREAT SERPL-MCNC: 0.81 MG/DL (ref 0.6–1.3)
EOSINOPHIL # BLD AUTO: 0.02 THOUSAND/ΜL (ref 0–0.61)
EOSINOPHIL NFR BLD AUTO: 0 % (ref 0–6)
ERYTHROCYTE [DISTWIDTH] IN BLOOD BY AUTOMATED COUNT: 12.8 % (ref 11.6–15.1)
FLUAV RNA RESP QL NAA+PROBE: NEGATIVE
FLUBV RNA RESP QL NAA+PROBE: NEGATIVE
GFR SERPL CREATININE-BSD FRML MDRD: 76 ML/MIN/1.73SQ M
GLUCOSE SERPL-MCNC: 113 MG/DL (ref 65–140)
GLUCOSE UR STRIP-MCNC: NEGATIVE MG/DL
HCT VFR BLD AUTO: 39.2 % (ref 34.8–46.1)
HGB BLD-MCNC: 12.8 G/DL (ref 11.5–15.4)
HGB UR QL STRIP.AUTO: ABNORMAL
IMM GRANULOCYTES # BLD AUTO: 0.03 THOUSAND/UL (ref 0–0.2)
IMM GRANULOCYTES NFR BLD AUTO: 0 % (ref 0–2)
INR PPP: 0.99 (ref 0.84–1.19)
KETONES UR STRIP-MCNC: ABNORMAL MG/DL
LEUKOCYTE ESTERASE UR QL STRIP: ABNORMAL
LIPASE SERPL-CCNC: 69 U/L (ref 73–393)
LYMPHOCYTES # BLD AUTO: 1.3 THOUSANDS/ΜL (ref 0.6–4.47)
LYMPHOCYTES NFR BLD AUTO: 12 % (ref 14–44)
MCH RBC QN AUTO: 29.8 PG (ref 26.8–34.3)
MCHC RBC AUTO-ENTMCNC: 32.7 G/DL (ref 31.4–37.4)
MCV RBC AUTO: 91 FL (ref 82–98)
MONOCYTES # BLD AUTO: 0.86 THOUSAND/ΜL (ref 0.17–1.22)
MONOCYTES NFR BLD AUTO: 8 % (ref 4–12)
NEUTROPHILS # BLD AUTO: 8.3 THOUSANDS/ΜL (ref 1.85–7.62)
NEUTS SEG NFR BLD AUTO: 80 % (ref 43–75)
NITRITE UR QL STRIP: POSITIVE
NON-SQ EPI CELLS URNS QL MICRO: ABNORMAL /HPF
NRBC BLD AUTO-RTO: 0 /100 WBCS
PH UR STRIP.AUTO: 6 [PH]
PLATELET # BLD AUTO: 204 THOUSANDS/UL (ref 149–390)
PMV BLD AUTO: 9.1 FL (ref 8.9–12.7)
POTASSIUM SERPL-SCNC: 4 MMOL/L (ref 3.5–5.3)
PROT SERPL-MCNC: 8.2 G/DL (ref 6.4–8.2)
PROT UR STRIP-MCNC: NEGATIVE MG/DL
PROTHROMBIN TIME: 12.9 SECONDS (ref 11.6–14.5)
RBC # BLD AUTO: 4.29 MILLION/UL (ref 3.81–5.12)
RBC #/AREA URNS AUTO: ABNORMAL /HPF
RSV RNA RESP QL NAA+PROBE: NEGATIVE
SARS-COV-2 RNA RESP QL NAA+PROBE: NEGATIVE
SODIUM SERPL-SCNC: 138 MMOL/L (ref 136–145)
SP GR UR STRIP.AUTO: 1.02 (ref 1–1.03)
UROBILINOGEN UR QL STRIP.AUTO: 0.2 E.U./DL
WBC # BLD AUTO: 10.55 THOUSAND/UL (ref 4.31–10.16)
WBC #/AREA URNS AUTO: ABNORMAL /HPF

## 2022-07-11 PROCEDURE — 81001 URINALYSIS AUTO W/SCOPE: CPT | Performed by: EMERGENCY MEDICINE

## 2022-07-11 PROCEDURE — 44970 LAPAROSCOPY APPENDECTOMY: CPT | Performed by: PHYSICIAN ASSISTANT

## 2022-07-11 PROCEDURE — 96375 TX/PRO/DX INJ NEW DRUG ADDON: CPT

## 2022-07-11 PROCEDURE — 99285 EMERGENCY DEPT VISIT HI MDM: CPT | Performed by: EMERGENCY MEDICINE

## 2022-07-11 PROCEDURE — 0241U HB NFCT DS VIR RESP RNA 4 TRGT: CPT | Performed by: PHYSICIAN ASSISTANT

## 2022-07-11 PROCEDURE — 88304 TISSUE EXAM BY PATHOLOGIST: CPT | Performed by: PATHOLOGY

## 2022-07-11 PROCEDURE — 99285 EMERGENCY DEPT VISIT HI MDM: CPT

## 2022-07-11 PROCEDURE — 93005 ELECTROCARDIOGRAM TRACING: CPT

## 2022-07-11 PROCEDURE — 96374 THER/PROPH/DIAG INJ IV PUSH: CPT

## 2022-07-11 PROCEDURE — G1004 CDSM NDSC: HCPCS

## 2022-07-11 PROCEDURE — 96376 TX/PRO/DX INJ SAME DRUG ADON: CPT

## 2022-07-11 PROCEDURE — 85610 PROTHROMBIN TIME: CPT | Performed by: EMERGENCY MEDICINE

## 2022-07-11 PROCEDURE — 99219 PR INITIAL OBSERVATION CARE/DAY 50 MINUTES: CPT | Performed by: PHYSICIAN ASSISTANT

## 2022-07-11 PROCEDURE — 36415 COLL VENOUS BLD VENIPUNCTURE: CPT | Performed by: EMERGENCY MEDICINE

## 2022-07-11 PROCEDURE — 80053 COMPREHEN METABOLIC PANEL: CPT | Performed by: EMERGENCY MEDICINE

## 2022-07-11 PROCEDURE — 96361 HYDRATE IV INFUSION ADD-ON: CPT

## 2022-07-11 PROCEDURE — 83690 ASSAY OF LIPASE: CPT | Performed by: EMERGENCY MEDICINE

## 2022-07-11 PROCEDURE — 74176 CT ABD & PELVIS W/O CONTRAST: CPT

## 2022-07-11 PROCEDURE — 76705 ECHO EXAM OF ABDOMEN: CPT

## 2022-07-11 PROCEDURE — 85025 COMPLETE CBC W/AUTO DIFF WBC: CPT | Performed by: EMERGENCY MEDICINE

## 2022-07-11 PROCEDURE — 85730 THROMBOPLASTIN TIME PARTIAL: CPT | Performed by: EMERGENCY MEDICINE

## 2022-07-11 RX ORDER — ONDANSETRON 2 MG/ML
4 INJECTION INTRAMUSCULAR; INTRAVENOUS EVERY 6 HOURS PRN
Status: DISCONTINUED | OUTPATIENT
Start: 2022-07-11 | End: 2022-07-12 | Stop reason: HOSPADM

## 2022-07-11 RX ORDER — ONDANSETRON 2 MG/ML
4 INJECTION INTRAMUSCULAR; INTRAVENOUS ONCE
Status: COMPLETED | OUTPATIENT
Start: 2022-07-11 | End: 2022-07-11

## 2022-07-11 RX ORDER — LIDOCAINE HYDROCHLORIDE 10 MG/ML
INJECTION, SOLUTION EPIDURAL; INFILTRATION; INTRACAUDAL; PERINEURAL AS NEEDED
Status: DISCONTINUED | OUTPATIENT
Start: 2022-07-11 | End: 2022-07-11

## 2022-07-11 RX ORDER — ONDANSETRON 2 MG/ML
4 INJECTION INTRAMUSCULAR; INTRAVENOUS ONCE AS NEEDED
Status: DISCONTINUED | OUTPATIENT
Start: 2022-07-11 | End: 2022-07-11 | Stop reason: HOSPADM

## 2022-07-11 RX ORDER — NEOSTIGMINE METHYLSULFATE 1 MG/ML
INJECTION INTRAVENOUS AS NEEDED
Status: DISCONTINUED | OUTPATIENT
Start: 2022-07-11 | End: 2022-07-11

## 2022-07-11 RX ORDER — BUPIVACAINE HYDROCHLORIDE AND EPINEPHRINE 5; 5 MG/ML; UG/ML
INJECTION, SOLUTION EPIDURAL; INTRACAUDAL; PERINEURAL AS NEEDED
Status: DISCONTINUED | OUTPATIENT
Start: 2022-07-11 | End: 2022-07-11 | Stop reason: HOSPADM

## 2022-07-11 RX ORDER — FENTANYL CITRATE 50 UG/ML
INJECTION, SOLUTION INTRAMUSCULAR; INTRAVENOUS AS NEEDED
Status: DISCONTINUED | OUTPATIENT
Start: 2022-07-11 | End: 2022-07-11

## 2022-07-11 RX ORDER — ONDANSETRON 2 MG/ML
INJECTION INTRAMUSCULAR; INTRAVENOUS AS NEEDED
Status: DISCONTINUED | OUTPATIENT
Start: 2022-07-11 | End: 2022-07-11

## 2022-07-11 RX ORDER — HYDROMORPHONE HCL/PF 1 MG/ML
0.5 SYRINGE (ML) INJECTION EVERY 4 HOURS PRN
Status: DISCONTINUED | OUTPATIENT
Start: 2022-07-11 | End: 2022-07-12 | Stop reason: HOSPADM

## 2022-07-11 RX ORDER — SUCCINYLCHOLINE/SOD CL,ISO/PF 100 MG/5ML
SYRINGE (ML) INTRAVENOUS AS NEEDED
Status: DISCONTINUED | OUTPATIENT
Start: 2022-07-11 | End: 2022-07-11

## 2022-07-11 RX ORDER — HYDROMORPHONE HCL/PF 1 MG/ML
1 SYRINGE (ML) INJECTION ONCE
Status: COMPLETED | OUTPATIENT
Start: 2022-07-11 | End: 2022-07-11

## 2022-07-11 RX ORDER — HEPARIN SODIUM 5000 [USP'U]/ML
5000 INJECTION, SOLUTION INTRAVENOUS; SUBCUTANEOUS EVERY 8 HOURS SCHEDULED
Status: DISCONTINUED | OUTPATIENT
Start: 2022-07-12 | End: 2022-07-12 | Stop reason: HOSPADM

## 2022-07-11 RX ORDER — SODIUM CHLORIDE, SODIUM LACTATE, POTASSIUM CHLORIDE, CALCIUM CHLORIDE 600; 310; 30; 20 MG/100ML; MG/100ML; MG/100ML; MG/100ML
75 INJECTION, SOLUTION INTRAVENOUS CONTINUOUS
Status: DISCONTINUED | OUTPATIENT
Start: 2022-07-11 | End: 2022-07-12 | Stop reason: HOSPADM

## 2022-07-11 RX ORDER — DEXAMETHASONE SODIUM PHOSPHATE 4 MG/ML
INJECTION, SOLUTION INTRA-ARTICULAR; INTRALESIONAL; INTRAMUSCULAR; INTRAVENOUS; SOFT TISSUE AS NEEDED
Status: DISCONTINUED | OUTPATIENT
Start: 2022-07-11 | End: 2022-07-11

## 2022-07-11 RX ORDER — ACETAMINOPHEN 325 MG/1
650 TABLET ORAL EVERY 6 HOURS PRN
Status: DISCONTINUED | OUTPATIENT
Start: 2022-07-11 | End: 2022-07-12 | Stop reason: HOSPADM

## 2022-07-11 RX ORDER — PROPOFOL 10 MG/ML
INJECTION, EMULSION INTRAVENOUS AS NEEDED
Status: DISCONTINUED | OUTPATIENT
Start: 2022-07-11 | End: 2022-07-11

## 2022-07-11 RX ORDER — HYDROMORPHONE HCL/PF 1 MG/ML
0.5 SYRINGE (ML) INJECTION ONCE
Status: COMPLETED | OUTPATIENT
Start: 2022-07-11 | End: 2022-07-11

## 2022-07-11 RX ORDER — ONDANSETRON 2 MG/ML
4 INJECTION INTRAMUSCULAR; INTRAVENOUS EVERY 6 HOURS PRN
Status: DISCONTINUED | OUTPATIENT
Start: 2022-07-11 | End: 2022-07-11

## 2022-07-11 RX ORDER — CALCIUM CARBONATE 200(500)MG
1000 TABLET,CHEWABLE ORAL DAILY PRN
Status: DISCONTINUED | OUTPATIENT
Start: 2022-07-11 | End: 2022-07-12 | Stop reason: HOSPADM

## 2022-07-11 RX ORDER — HEPARIN SODIUM 5000 [USP'U]/ML
5000 INJECTION, SOLUTION INTRAVENOUS; SUBCUTANEOUS EVERY 8 HOURS SCHEDULED
Status: DISCONTINUED | OUTPATIENT
Start: 2022-07-11 | End: 2022-07-11

## 2022-07-11 RX ORDER — EPHEDRINE SULFATE 50 MG/ML
INJECTION INTRAVENOUS AS NEEDED
Status: DISCONTINUED | OUTPATIENT
Start: 2022-07-11 | End: 2022-07-11

## 2022-07-11 RX ORDER — OMEGA-3-ACID ETHYL ESTERS 1 G/1
2 CAPSULE, LIQUID FILLED ORAL 2 TIMES DAILY
COMMUNITY

## 2022-07-11 RX ORDER — HYDROCODONE BITARTRATE AND ACETAMINOPHEN 5; 325 MG/1; MG/1
1 TABLET ORAL EVERY 6 HOURS PRN
Status: DISCONTINUED | OUTPATIENT
Start: 2022-07-11 | End: 2022-07-12 | Stop reason: HOSPADM

## 2022-07-11 RX ORDER — ROCURONIUM BROMIDE 10 MG/ML
INJECTION, SOLUTION INTRAVENOUS AS NEEDED
Status: DISCONTINUED | OUTPATIENT
Start: 2022-07-11 | End: 2022-07-11

## 2022-07-11 RX ORDER — MAGNESIUM HYDROXIDE 1200 MG/15ML
LIQUID ORAL AS NEEDED
Status: DISCONTINUED | OUTPATIENT
Start: 2022-07-11 | End: 2022-07-11 | Stop reason: HOSPADM

## 2022-07-11 RX ORDER — MIDAZOLAM HYDROCHLORIDE 2 MG/2ML
INJECTION, SOLUTION INTRAMUSCULAR; INTRAVENOUS AS NEEDED
Status: DISCONTINUED | OUTPATIENT
Start: 2022-07-11 | End: 2022-07-11

## 2022-07-11 RX ORDER — FENTANYL CITRATE/PF 50 MCG/ML
25 SYRINGE (ML) INJECTION
Status: DISCONTINUED | OUTPATIENT
Start: 2022-07-11 | End: 2022-07-11 | Stop reason: HOSPADM

## 2022-07-11 RX ORDER — GLYCOPYRROLATE 0.2 MG/ML
INJECTION INTRAMUSCULAR; INTRAVENOUS AS NEEDED
Status: DISCONTINUED | OUTPATIENT
Start: 2022-07-11 | End: 2022-07-11

## 2022-07-11 RX ADMIN — LIDOCAINE HYDROCHLORIDE 50 MG: 10 INJECTION, SOLUTION EPIDURAL; INFILTRATION; INTRACAUDAL; PERINEURAL at 17:11

## 2022-07-11 RX ADMIN — HYDROMORPHONE HYDROCHLORIDE 1 MG: 1 INJECTION, SOLUTION INTRAMUSCULAR; INTRAVENOUS; SUBCUTANEOUS at 11:06

## 2022-07-11 RX ADMIN — NEOSTIGMINE METHYLSULFATE 3 MG: 1 INJECTION, SOLUTION INTRAVENOUS at 18:02

## 2022-07-11 RX ADMIN — Medication 100 MG: at 17:11

## 2022-07-11 RX ADMIN — PROPOFOL 150 MG: 10 INJECTION, EMULSION INTRAVENOUS at 17:11

## 2022-07-11 RX ADMIN — ROCURONIUM BROMIDE 25 MG: 50 INJECTION, SOLUTION INTRAVENOUS at 17:16

## 2022-07-11 RX ADMIN — HEPARIN SODIUM 5000 UNITS: 5000 INJECTION INTRAVENOUS; SUBCUTANEOUS at 23:45

## 2022-07-11 RX ADMIN — EPHEDRINE SULFATE 10 MG: 50 INJECTION, SOLUTION INTRAVENOUS at 17:19

## 2022-07-11 RX ADMIN — HYDROMORPHONE HYDROCHLORIDE 1 MG: 1 INJECTION, SOLUTION INTRAMUSCULAR; INTRAVENOUS; SUBCUTANEOUS at 15:27

## 2022-07-11 RX ADMIN — MIDAZOLAM 2 MG: 1 INJECTION INTRAMUSCULAR; INTRAVENOUS at 17:06

## 2022-07-11 RX ADMIN — FENTANYL CITRATE 50 MCG: 50 INJECTION INTRAMUSCULAR; INTRAVENOUS at 17:21

## 2022-07-11 RX ADMIN — ONDANSETRON 4 MG: 2 INJECTION INTRAMUSCULAR; INTRAVENOUS at 09:15

## 2022-07-11 RX ADMIN — HYDROCODONE BITARTRATE AND ACETAMINOPHEN 1 TABLET: 5; 325 TABLET ORAL at 20:00

## 2022-07-11 RX ADMIN — GLYCOPYRROLATE 0.4 MCG: 0.2 INJECTION, SOLUTION INTRAMUSCULAR; INTRAVENOUS at 18:02

## 2022-07-11 RX ADMIN — ONDANSETRON 4 MG: 2 INJECTION INTRAMUSCULAR; INTRAVENOUS at 17:16

## 2022-07-11 RX ADMIN — HYDROMORPHONE HYDROCHLORIDE 0.5 MG: 1 INJECTION, SOLUTION INTRAMUSCULAR; INTRAVENOUS; SUBCUTANEOUS at 09:13

## 2022-07-11 RX ADMIN — FENTANYL CITRATE 50 MCG: 50 INJECTION INTRAMUSCULAR; INTRAVENOUS at 17:11

## 2022-07-11 RX ADMIN — SODIUM CHLORIDE, SODIUM LACTATE, POTASSIUM CHLORIDE, AND CALCIUM CHLORIDE 75 ML/HR: .6; .31; .03; .02 INJECTION, SOLUTION INTRAVENOUS at 19:51

## 2022-07-11 RX ADMIN — DEXAMETHASONE SODIUM PHOSPHATE 4 MG: 4 INJECTION INTRA-ARTICULAR; INTRALESIONAL; INTRAMUSCULAR; INTRAVENOUS; SOFT TISSUE at 17:16

## 2022-07-11 RX ADMIN — SODIUM CHLORIDE 1000 ML: 0.9 INJECTION, SOLUTION INTRAVENOUS at 09:11

## 2022-07-11 RX ADMIN — PIPERACILLIN AND TAZOBACTAM 3.38 G: 3; .375 INJECTION, POWDER, LYOPHILIZED, FOR SOLUTION INTRAVENOUS at 15:47

## 2022-07-11 NOTE — ED PROVIDER NOTES
History  Chief Complaint   Patient presents with    Abdominal Pain     Patient c/o pain across upper abdomen starting at 0200 this morning  Intermittent nausea with dry heaves  Denies diarrhea  Last bm was this morning  Patient states she last ate some baloney and cheese last night  She was well when she went to bed  About 0200 hours, 7 hours prior to arrival, she awoke with upper abdominal pain that radiates across the abdomen in a bandlike fashion  Associated with distension and nausea  No fever chills, no vomiting or bowel changes  Patient was able to move her bowels this morning but pain continues  She has a history of a Nany-en-Y procedure 11 years ago for weight loss  Patient states she had lost about 100 lb          Prior to Admission Medications   Prescriptions Last Dose Informant Patient Reported? Taking? CALCIUM PO 7/10/2022 at Unknown time  Yes Yes   Sig: Take by mouth   Garlic 1644 MG CAPS 4/53/5189 at Unknown time  Yes Yes   Sig: Take by mouth   VITAMIN D, ERGOCALCIFEROL, PO 7/10/2022 at Unknown time  Yes Yes   Sig: Take by mouth   cyanocobalamin (VITAMIN B-12) 500 mcg tablet 7/10/2022 at Unknown time  Yes Yes   Sig: Take 500 mcg by mouth daily  ibuprofen (MOTRIN) 800 mg tablet Not Taking at Unknown time Self Yes No   Sig: Take 800 mg by mouth every 6 (six) hours as needed for mild pain   Patient not taking: Reported on 7/11/2022   methocarbamol (ROBAXIN) 500 mg tablet Not Taking at Unknown time  No No   Sig: Take 1 tablet (500 mg total) by mouth 2 (two) times a day   Patient not taking: Reported on 7/11/2022   multivitamins-fortified A-D-K (SOURCECF) solution 7/10/2022 at Unknown time  Yes Yes   Sig: Take 0 5 mL by mouth daily     omega-3-acid ethyl esters (LOVAZA) 1 g capsule 7/10/2022 at Unknown time  Yes Yes   Sig: Take 2 g by mouth 2 (two) times a day      Facility-Administered Medications: None       Past Medical History:   Diagnosis Date    Acid reflux     GERD (gastroesophageal reflux disease)        Past Surgical History:   Procedure Laterality Date    BREAST SURGERY Left 1975    lumpectomy, benign    COLONOSCOPY N/A 1/12/2017    Procedure: COLONOSCOPY;  Surgeon: Radha Lott MD;  Location: [place] [address] GI LAB; Service:     ESOPHAGOGASTRODUODENOSCOPY N/A 1/12/2017    Procedure: ESOPHAGOGASTRODUODENOSCOPY (EGD); Surgeon: Radha Lott MD;  Location: [place] [address] GI LAB; Service:     FRACTURE SURGERY      nose    GASTRIC BYPASS  2011    HYSTERECTOMY      JOINT REPLACEMENT Left     knee    CT CORRJ HALLUX VALGUS W/SESMDC W/DIST METAR OSTEOT Right 1/11/2016    Procedure: Melani Crenshaw with Internal Fixation Right Foot;  Surgeon: Arelis Munoz DPM;  Location: [facility] OR;  Service: Podiatry    ROOSEVELT-EN-Y PROCEDURE      TONSILLECTOMY         Family History   Problem Relation Age of Onset    COPD Mother     COPD Father     COPD Brother     Diabetes Maternal Grandmother     Cancer Paternal Grandmother         ovarian    No Known Problems Sister     No Known Problems Maternal Aunt     No Known Problems Maternal Uncle     No Known Problems Paternal Aunt     No Known Problems Paternal Uncle     No Known Problems Maternal Grandfather     No Known Problems Paternal Grandfather     ADD / ADHD Neg Hx     Anesthesia problems Neg Hx     Clotting disorder Neg Hx     Collagen disease Neg Hx     Dislocations Neg Hx     Learning disabilities Neg Hx     Neurological problems Neg Hx     Osteoporosis Neg Hx     Rheumatologic disease Neg Hx     Scoliosis Neg Hx     Vascular Disease Neg Hx      I have reviewed and agree with the history as documented  E-Cigarette/Vaping    E-Cigarette Use Never User      E-Cigarette/Vaping Substances     Social History     Tobacco Use    Smoking status: Never Smoker    Smokeless tobacco: Never Used   Vaping Use    Vaping Use: Never used   Substance Use Topics    Alcohol use: Yes     Comment: socially    Drug use:  No Review of Systems   Constitutional: Negative for chills and fever  HENT: Negative for congestion and sore throat  Eyes: Negative for visual disturbance  Respiratory: Negative for shortness of breath  Cardiovascular: Negative for chest pain  Gastrointestinal: Positive for abdominal distention, abdominal pain and nausea  Genitourinary: Negative for dysuria and hematuria  Musculoskeletal: Negative for back pain  Neurological: Negative for headaches  Hematological: Does not bruise/bleed easily  Psychiatric/Behavioral: Negative for confusion  All other systems reviewed and are negative  Physical Exam  Physical Exam  Vitals and nursing note reviewed  Constitutional:       Appearance: She is well-developed  HENT:      Head: Normocephalic  Mouth/Throat:      Mouth: Mucous membranes are moist    Eyes:      Extraocular Movements: Extraocular movements intact  Cardiovascular:      Rate and Rhythm: Normal rate and regular rhythm  Pulmonary:      Effort: Pulmonary effort is normal       Breath sounds: Normal breath sounds  Abdominal:      General: Bowel sounds are normal       Palpations: Abdomen is soft  Tenderness: There is abdominal tenderness in the right upper quadrant, right lower quadrant and epigastric area  Positive signs include Wray's sign  Skin:     General: Skin is warm and dry  Capillary Refill: Capillary refill takes less than 2 seconds  Neurological:      General: No focal deficit present  Mental Status: She is alert and oriented to person, place, and time     Psychiatric:         Mood and Affect: Mood normal          Behavior: Behavior normal          Vital Signs  ED Triage Vitals [07/11/22 0850]   Temperature Pulse Respirations Blood Pressure SpO2   (!) 97 2 °F (36 2 °C) 72 17 157/70 100 %      Temp Source Heart Rate Source Patient Position - Orthostatic VS BP Location FiO2 (%)   Tympanic Monitor Sitting Left arm --      Pain Score       10 - Worst Possible Pain           Vitals:    07/11/22 1502 07/11/22 1517 07/11/22 1532 07/11/22 1547   BP:  119/58 115/64 118/64   Pulse: 76 78 92 88   Patient Position - Orthostatic VS:             Visual Acuity      ED Medications  Medications   piperacillin-tazobactam (ZOSYN) IVPB 3 375 g (3 375 g Intravenous New Bag 7/11/22 1547)   sodium chloride 0 9 % bolus 1,000 mL (0 mL Intravenous Stopped 7/11/22 1050)   HYDROmorphone (DILAUDID) injection 0 5 mg (0 5 mg Intravenous Given 7/11/22 0913)   ondansetron (ZOFRAN) injection 4 mg (4 mg Intravenous Given 7/11/22 0915)   HYDROmorphone (DILAUDID) injection 1 mg (1 mg Intravenous Given 7/11/22 1106)   barium (READI-CAT 2) suspension 900 mL (900 mL Oral Given 7/11/22 1225)   HYDROmorphone (DILAUDID) injection 1 mg (1 mg Intravenous Given 7/11/22 1527)       Diagnostic Studies  Results Reviewed     Procedure Component Value Units Date/Time    FLU/COVID - if FLU clinically relevant [[id number]] Collected: 07/11/22 1550    Lab Status:  In process Specimen: Nares from Nose Updated: 07/11/22 1556    Urine Microscopic [[id number]]  (Abnormal) Collected: 07/11/22 1105    Lab Status: Final result Specimen: Urine, Clean Catch Updated: 07/11/22 1138     RBC, UA 0-1 /hpf      WBC, UA 20-30 /hpf      Epithelial Cells Occasional /hpf      Bacteria, UA Innumerable /hpf     UA (URINE) with reflex to Scope [[id number]]  (Abnormal) Collected: 07/11/22 1105    Lab Status: Final result Specimen: Urine, Clean Catch Updated: 07/11/22 1131     Color, UA Yellow     Clarity, UA Slightly Cloudy     Specific Gravity, UA 1 025     pH, UA 6 0     Leukocytes, UA Small     Nitrite, UA Positive     Protein, UA Negative mg/dl      Glucose, UA Negative mg/dl      Ketones, UA 15 (1+) mg/dl      Urobilinogen, UA 0 2 E U /dl      Bilirubin, UA Negative     Occult Blood, UA Trace-Intact    Lipase [[id number]]  (Abnormal) Collected: 07/11/22 0908    Lab Status: Final result Specimen: Blood from Arm, Right Updated: 07/11/22 0929     Lipase 69 u/L     Comprehensive metabolic panel [[id number]]  (Abnormal) Collected: 07/11/22 0908    Lab Status: Final result Specimen: Blood from Arm, Right Updated: 07/11/22 0929     Sodium 138 mmol/L      Potassium 4 0 mmol/L      Chloride 102 mmol/L      CO2 25 mmol/L      ANION GAP 11 mmol/L      BUN 11 mg/dL      Creatinine 0 81 mg/dL      Glucose 113 mg/dL      Calcium 9 3 mg/dL      AST 23 U/L      ALT 26 U/L      Alkaline Phosphatase 122 U/L      Total Protein 8 2 g/dL      Albumin 4 1 g/dL      Total Bilirubin 1 27 mg/dL      eGFR 76 ml/min/1 73sq m     Narrative:      National Kidney Disease Foundation guidelines for Chronic Kidney Disease (CKD):     Stage 1 with normal or high GFR (GFR > 90 mL/min/1 73 square meters)    Stage 2 Mild CKD (GFR = 60-89 mL/min/1 73 square meters)    Stage 3A Moderate CKD (GFR = 45-59 mL/min/1 73 square meters)    Stage 3B Moderate CKD (GFR = 30-44 mL/min/1 73 square meters)    Stage 4 Severe CKD (GFR = 15-29 mL/min/1 73 square meters)    Stage 5 End Stage CKD (GFR <15 mL/min/1 73 square meters)  Note: GFR calculation is accurate only with a steady state creatinine    Protime-INR [[id number]]  (Normal) Collected: 07/11/22 0908    Lab Status: Final result Specimen: Blood from Arm, Right Updated: 07/11/22 0924     Protime 12 9 seconds      INR 0 99    APTT [[id number]]  (Normal) Collected: 07/11/22 0908    Lab Status: Final result Specimen: Blood from Arm, Right Updated: 07/11/22 0924     PTT 28 seconds     CBC and differential [[id number]]  (Abnormal) Collected: 07/11/22 0908    Lab Status: Final result Specimen: Blood from Arm, Right Updated: 07/11/22 0912     WBC 10 55 Thousand/uL      RBC 4 29 Million/uL      Hemoglobin 12 8 g/dL      Hematocrit 39 2 %      MCV 91 fL      MCH 29 8 pg      MCHC 32 7 g/dL      RDW 12 8 %      MPV 9 1 fL      Platelets 006 Thousands/uL      nRBC 0 /100 WBCs      Neutrophils Relative 80 %      Immat GRANS % 0 % Lymphocytes Relative 12 %      Monocytes Relative 8 %      Eosinophils Relative 0 %      Basophils Relative 0 %      Neutrophils Absolute 8 30 Thousands/µL      Immature Grans Absolute 0 03 Thousand/uL      Lymphocytes Absolute 1 30 Thousands/µL      Monocytes Absolute 0 86 Thousand/µL      Eosinophils Absolute 0 02 Thousand/µL      Basophils Absolute 0 04 Thousands/µL                  CT abdomen pelvis wo contrast   Final Result by Bashir Torres DO (07/11 1525)      Findings compatible with appendicitis  There is periappendiceal fat stranding identified and trace fluid in the right paracolic gutter  I personally discussed this study with Sumit Ludwig on 7/11/2022 at 3:25 PM                      Workstation performed: [id number] [facility] abdomen limited   Final Result by Candia Siemens, MD (07/11 1005)      Gallbladder sludge with sonographic Wray's sign elicited, but there is no gallbladder distention, gallbladder wall thickening/pericholecystic fluid, or shadowing calculi  Findings could potentially represent early acute cholecystitis with a    nonvisualized stone in the cystic duct  The study was marked in EPIC for significant notification        Workstation performed: [id number]                    Procedures  ECG 12 Lead Documentation Only    Date/Time: 7/11/2022 3:57 PM  Performed by: Rajendra Pitt MD  Authorized by: Rajendra Pitt MD     Indications / Diagnosis:  Abdominal pain  ECG reviewed by me, the ED Provider: yes    Patient location:  ED  Interpretation:     Interpretation: normal    Rate:     ECG rate:  88    ECG rate assessment: normal    Rhythm:     Rhythm: sinus rhythm    Ectopy:     Ectopy: none    QRS:     QRS axis:  Normal    QRS intervals:  Normal  Conduction:     Conduction: normal    ST segments:     ST segments:  Normal  T waves:     T waves: normal               ED Course                                             MDM  Number of Diagnoses or Management Options  Abdominal pain  Acute appendicitis  Diagnosis management comments: Postprandial pain after high fat meal   Suspect gallbladder disease      Disposition  Final diagnoses:   Abdominal pain   Acute appendicitis     Time reflects when diagnosis was documented in both MDM as applicable and the Disposition within this note     Time User Action Codes Description Comment    7/11/2022  3:31 PM Leila Short A Add [R10 9] Abdominal pain     7/11/2022  3:31 PM Leila Short A Add [K35 80] Acute appendicitis       ED Disposition     ED Disposition   Admit    Condition   Stable    Date/Time   Mon Jul 11, 2022  3:33 PM    Comment   Case was discussed with General surgery and the patient's admission status was agreed to be Admission Status: observation status to the service of Dr Víctor Ruiz   Follow-up Information    None         Patient's Medications   Discharge Prescriptions    No medications on file       No discharge procedures on file      PDMP Review     None          ED Provider  Electronically Signed by           Fredy Cruz MD  07/11/22 4199 Brooke Griffiths MD  07/12/22 0314

## 2022-07-11 NOTE — CONSULTS
Consultation - General Surgery   Nelly Correa 73 y o  female MRN: [id number]  Unit/Bed#: ED 10 Encounter: [id number]    Assessment/Plan     Assessment:  · Right sided abdominal pain -- Abdominal U/S revealed presence of sludge in the gallbladder without any stones, choledocholithiasis, wall thickening, or GB distention  CBD normal size, Liver is normal in appearance and size  · Mild leukocytosis -- barely elevated WBC count at 10 5  · Bacteruria -- incidental finding  · Mildly elevated LFT's -- alk phos 122 and T bili 1 2      Plan:  OR for laparoscopic appendectomy possible open      Case request placed  Consent obtained  Patient verbalizes understanding and agrees with treatment plan  Continue IV antibiotics zosyn  Coordinated care with OR staff and anesthesia       History of Present Illness     HPI:  Nelly Correa is a 73 y o  female with history of hysterectomy and Nany-en-Y gastric bypass procedure who presents with less than 24 hrs of generalized abdominal pain  Patient reports the pain awoke her from sleep in the middle of the night  It was generalized and aching  She was unable to fall back asleep and the pain continued so patient presented to ED  She briefly had some associated nausea and chills this morning after arriving at the hospital  The pain is now limited to the right side and epigastrium  It doesn't radiate  It's not sharp or localized, but rather generalized and aching  Denies any vomiting, changes in bowel movements, dysuria, hematuria, dark urine, light colored stool, or history of post prandial bloating or discomfort  Her last bowel movement was this morning  She's never had anything like this before  She reports having bologna and cheese for dinner last night  Surgical history pertinent for hysterectomy in 1999 and Nany-en-Y gastric bypass in 2011    Patient was recently on a prednisone taper for the month of June after injuring her back, otherwise does not take any prescribed medications  Consults          Review of Systems   Constitutional: Positive for chills  Negative for appetite change, fatigue and fever  HENT: Negative  Respiratory: Negative for cough, chest tightness, shortness of breath and wheezing  Cardiovascular: Negative for chest pain, palpitations and leg swelling  Gastrointestinal: Positive for abdominal pain and nausea  Negative for abdominal distention, blood in stool, constipation, diarrhea and vomiting  Genitourinary: Negative for decreased urine volume, difficulty urinating, dysuria, flank pain and hematuria  Musculoskeletal: Negative  Skin: Negative for rash and wound  Neurological: Negative for dizziness, syncope, weakness, light-headedness and headaches  Hematological: Negative  Psychiatric/Behavioral: Negative  Historical Information   Past Medical History:   Diagnosis Date    Acid reflux     GERD (gastroesophageal reflux disease)      Past Surgical History:   Procedure Laterality Date    BREAST SURGERY Left 1975    lumpectomy, benign    COLONOSCOPY N/A 1/12/2017    Procedure: COLONOSCOPY;  Surgeon: Henretta Riedel, MD;  Location: [place] [address] GI LAB; Service:     ESOPHAGOGASTRODUODENOSCOPY N/A 1/12/2017    Procedure: ESOPHAGOGASTRODUODENOSCOPY (EGD); Surgeon: Henretta Riedel, MD;  Location: [place] [address] [facility] LAB;   Service:     FRACTURE SURGERY      nose    GASTRIC BYPASS  2011    HYSTERECTOMY      JOINT REPLACEMENT Left     knee    [place] CORRJ HALLUX VALGUS W/SESMDC W/DIST [facility] Right 1/11/2016    Procedure: Docia Levee with Internal Fixation Right Foot;  Surgeon: Tanner Padilla DPM;  Location: [address] [place] [address];  Service: Podiatry    ROOSEVELT-EN-Y PROCEDURE      TONSILLECTOMY       Social History   Social History     Substance and Sexual Activity   Alcohol Use Yes    Comment: socially     Social History     Substance and Sexual Activity   Drug Use No     E-Cigarette/Vaping    E-Cigarette Use Never User      E-Cigarette/Vaping Substances     Social History     Tobacco Use   Smoking Status Never Smoker   Smokeless Tobacco Never Used     Family History:   Family History   Problem Relation Age of Onset    COPD Mother    [facility] COPD Father     COPD Brother     Diabetes Maternal Grandmother     Cancer Paternal Grandmother         ovarian    No Known Problems Sister     No Known Problems Maternal Aunt     No Known Problems Maternal Uncle     No Known Problems Paternal Aunt     No Known Problems Paternal Uncle     No Known Problems Maternal Grandfather     No Known Problems Paternal Grandfather     ADD / ADHD Neg Hx     Anesthesia problems Neg Hx     Clotting disorder Neg Hx     Collagen disease Neg Hx     Dislocations Neg Hx     Learning disabilities Neg Hx     Neurological problems Neg Hx     Osteoporosis Neg Hx     Rheumatologic disease Neg Hx     Scoliosis Neg Hx     Vascular Disease Neg Hx        Meds/Allergies   all current active meds have been reviewed  Allergies   Allergen Reactions    Nsaids Itching    Oxycodone-Acetaminophen Itching    Celecoxib     Meloxicam     Adhesive [Medical Tape] Rash       Objective   First Vitals:   Blood Pressure: 157/70 (07/11/22 0850)  Pulse: 72 (07/11/22 0850)  Temperature: (!) 97 2 °F (36 2 °C) (07/11/22 0850)  Temp Source: Tympanic (07/11/22 0850)  Respirations: 17 (07/11/22 0850)  Weight - Scale: 70 8 kg (156 lb) (07/11/22 0850)  SpO2: 100 % (07/11/22 0850)    Current Vitals:   Blood Pressure: 144/65 (07/11/22 1000)  Pulse: 78 (07/11/22 1000)  Temperature: (!) 97 2 °F (36 2 °C) (07/11/22 0850)  Temp Source: Tympanic (07/11/22 0850)  Respirations: 16 (07/11/22 1000)  Weight - Scale: 70 8 kg (156 lb) (07/11/22 0850)  SpO2: 95 % (07/11/22 1000)      Intake/Output Summary (Last 24 hours) at 7/11/2022 1120  Last data filed at 7/11/2022 1050  Gross per 24 hour   Intake 1000 ml   Output --   Net 1000 ml       Invasive Devices  Report    Peripheral Intravenous Line  Duration Peripheral IV 07/11/22 Right Antecubital <1 day                Physical Exam  Vitals reviewed  Constitutional:       General: She is awake  She is not in acute distress  Appearance: Normal appearance  She is well-developed and overweight  She is not toxic-appearing or diaphoretic  Interventions: She is not intubated  Face mask in place  HENT:      Head: Normocephalic and atraumatic  Not macrocephalic and not microcephalic  No raccoon eyes, Hinton's sign, right periorbital erythema or left periorbital erythema  Right Ear: External ear normal       Left Ear: External ear normal       Nose: Nose normal    Eyes:      General: No scleral icterus  Right eye: No discharge  Left eye: No discharge  Conjunctiva/sclera: Conjunctivae normal       Right eye: Right conjunctiva is not injected  No hemorrhage  Left eye: Left conjunctiva is not injected  No hemorrhage  Pupils: Pupils are equal, round, and reactive to light  Cardiovascular:      Rate and Rhythm: Normal rate and regular rhythm  Pulses: Normal pulses  Heart sounds: Normal heart sounds  Pulmonary:      Effort: Pulmonary effort is normal  No tachypnea, bradypnea or respiratory distress  She is not intubated  Breath sounds: Normal breath sounds  No stridor  No decreased breath sounds, wheezing or rhonchi  Abdominal:      General: A surgical scar is present  Bowel sounds are decreased  There is no distension  Palpations: Abdomen is soft  Abdomen is not rigid  Tenderness: There is abdominal tenderness in the right upper quadrant, right lower quadrant and epigastric area  There is no guarding or rebound  Hernia: No hernia is present  Musculoskeletal:      Right lower leg: No edema  Left lower leg: No edema  Skin:     General: Skin is warm and dry  Capillary Refill: Capillary refill takes less than 2 seconds  Coloration: Skin is not cyanotic, jaundiced or mottled  Findings: No rash  Neurological:      General: No focal deficit present  Mental Status: She is alert and oriented to person, place, and time  She is not disoriented  GCS: GCS eye subscore is 4  GCS verbal subscore is 5  GCS motor subscore is 6  Cranial Nerves: Cranial nerves are intact  No cranial nerve deficit  Sensory: Sensation is intact  Motor: Motor function is intact  Psychiatric:         Attention and Perception: Attention normal          Speech: Speech normal          Behavior: Behavior normal  Behavior is cooperative  Thought Content: Thought content normal          Lab Results:   I have personally reviewed pertinent lab results  , CBC:   Lab Results   Component Value Date    WBC 10 55 (H) 07/11/2022    HGB 12 8 07/11/2022    HCT 39 2 07/11/2022    MCV 91 07/11/2022     07/11/2022    MCH 29 8 07/11/2022    MCHC 32 7 07/11/2022    RDW 12 8 07/11/2022    MPV 9 1 07/11/2022    NRBC 0 07/11/2022   , CMP:   Lab Results   Component Value Date    SODIUM 138 07/11/2022    K 4 0 07/11/2022     07/11/2022    CO2 25 07/11/2022    BUN 11 07/11/2022    CREATININE 0 81 07/11/2022    CALCIUM 9 3 07/11/2022    AST 23 07/11/2022    ALT 26 07/11/2022    ALKPHOS 122 (H) 07/11/2022    EGFR 76 07/11/2022   , Coagulation:   Lab Results   Component Value Date    INR 0 99 07/11/2022   , Urinalysis:   Lab Results   Component Value Date    COLORU Yellow 07/11/2022    CLARITYU Slightly Cloudy 07/11/2022    SPECGRAV 1 025 07/11/2022    PHUR 6 0 07/11/2022    LEUKOCYTESUR Small (A) 07/11/2022    NITRITE Positive (A) 07/11/2022    GLUCOSEU Negative 07/11/2022    KETONESU 15 (1+) (A) 07/11/2022    BILIRUBINUR Negative 07/11/2022    BLOODU Trace-Intact (A) 07/11/2022   , Lipase:   Lab Results   Component Value Date    LIPASE 69 (L) 07/11/2022     Imaging: I have personally reviewed pertinent reports     and I have personally reviewed pertinent films in PACS  EKG, Pathology, and Other Studies: I have personally reviewed pertinent reports  Counseling / Coordination of Care  Total floor / unit time spent today 45 minutes  Greater than 50% of total time was spent with the patient and / or family counseling and / or coordination of care  A description of the counseling / coordination of care: Obtaining patient history, performing physical exam, reviewing pertinent labs and imaging, discussed management with attending physician

## 2022-07-11 NOTE — ANESTHESIA PREPROCEDURE EVALUATION
Procedure:  APPENDECTOMY LAPAROSCOPIC (N/A Abdomen)    Relevant Problems   GI/HEPATIC   (+) Gastroesophageal reflux disease        Physical Exam    Airway    Mallampati score: II  TM Distance: >3 FB  Neck ROM: full     Dental   No notable dental hx     Cardiovascular  Cardiovascular exam normal    Pulmonary  Pulmonary exam normal     Other Findings    S/P gastric bypass    Anesthesia Plan  ASA Score- 2     Anesthesia Type- general with ASA Monitors  Additional Monitors:   Airway Plan: ETT  Plan Factors-Exercise tolerance (METS): >4 METS  Chart reviewed  Imaging results reviewed  Existing labs reviewed  Patient summary reviewed  Patient is not a current smoker  Induction- intravenous  Postoperative Plan- Plan for postoperative opioid use  Informed Consent- Anesthetic plan and risks discussed with patient  I personally reviewed this patient with the CRNA  Discussed and agreed on the Anesthesia Plan with the CRNA  Gregorio Steen

## 2022-07-11 NOTE — OP NOTE
OPERATIVE REPORT  PATIENT NAME: Hope Yao    :  1955  MRN: [id number]  Pt Location: [facility] OR ROOM 02    SURGERY DATE: 2022    Surgeon(s) and Role:     * Mora Mcclure MD - Primary     * CHERELLE Barrera-C - [address] [place] [address], DO - Assisting    Preop Diagnosis:  Acute appendicitis [K35 80]    Post-Op Diagnosis Codes:     * Acute appendicitis [K35 80]    Procedure(s) (LRB):  APPENDECTOMY LAPAROSCOPIC (N/A)    Specimen(s):  ID Type Source Tests Collected by Time Destination   1 :  Tissue Appendix TISSUE EXAM Mora Mcclure MD 2022 1725        Estimated Blood Loss:   Minimal    Drains:  * No LDAs found *    Anesthesia Type:   Choice    Operative Indications:  Acute appendicitis [K35 80]      Operative Findings:  Acute suppurative appendicitis    Complications:   None    Procedure and Technique:  Laparoscopic appendectomy  Patient was taken back to main operating room, placed supine on the operating table, general anesthesia was induced, and the abdomen was prepped and draped in normal fashion  Utilizing the Veress needle at the umbilicus, a pneumoperitoneum was created to 15 mmHg and maintained this level throughout the case  An 11 mm trocar was placed at the umbilicus  Two additional 5 mm trocars were placed in lower abdomen  Utilizing standard laparoscopic technique, a retrocecal appendix was identified and mobilized  It was acutely inflamed but without without perforation  The mesoappendix was transected with the Harmonic scalpel  Endo-loops were placed around the base of the appendix  The appendix was divided, placed in the Endo-Catch bag, removed from the umbilical port  Fascial opening at the umbilicus was closed with 0 Vicryl suture  Four 0 Monocryl was used to approximate the skin edges  Exofin was placed as a dressing  The patient awoke from general anesthesia, was extubated in operating room, and sent to the PACU in stable condition      juan Clarke 49 year General surgery resident, was required for technical assistance and retraction   I was present for the entire procedure and A physician assistant was required during the procedure for retraction tissue handling,dissection and suturing    Patient Disposition:  PACU       SIGNATURE: Corina Araiza MD  DATE: July 11, 2022  TIME: 6:03 PM

## 2022-07-12 VITALS
HEART RATE: 60 BPM | DIASTOLIC BLOOD PRESSURE: 71 MMHG | WEIGHT: 156 LBS | RESPIRATION RATE: 17 BRPM | HEIGHT: 65 IN | SYSTOLIC BLOOD PRESSURE: 129 MMHG | TEMPERATURE: 98.1 F | OXYGEN SATURATION: 96 % | BODY MASS INDEX: 25.99 KG/M2

## 2022-07-12 LAB
ERYTHROCYTE [DISTWIDTH] IN BLOOD BY AUTOMATED COUNT: 13.2 % (ref 11.6–15.1)
HCT VFR BLD AUTO: 32.6 % (ref 34.8–46.1)
HGB BLD-MCNC: 10.6 G/DL (ref 11.5–15.4)
MCH RBC QN AUTO: 30.3 PG (ref 26.8–34.3)
MCHC RBC AUTO-ENTMCNC: 32.5 G/DL (ref 31.4–37.4)
MCV RBC AUTO: 93 FL (ref 82–98)
PLATELET # BLD AUTO: 149 THOUSANDS/UL (ref 149–390)
PMV BLD AUTO: 9.4 FL (ref 8.9–12.7)
RBC # BLD AUTO: 3.5 MILLION/UL (ref 3.81–5.12)
WBC # BLD AUTO: 10.18 THOUSAND/UL (ref 4.31–10.16)

## 2022-07-12 PROCEDURE — 99024 POSTOP FOLLOW-UP VISIT: CPT | Performed by: SPECIALIST

## 2022-07-12 PROCEDURE — 85027 COMPLETE CBC AUTOMATED: CPT | Performed by: PHYSICIAN ASSISTANT

## 2022-07-12 RX ORDER — OXYCODONE HYDROCHLORIDE AND ACETAMINOPHEN 5; 325 MG/1; MG/1
1 TABLET ORAL EVERY 6 HOURS PRN
Qty: 8 TABLET | Refills: 0 | Status: SHIPPED | OUTPATIENT
Start: 2022-07-12

## 2022-07-12 RX ADMIN — HEPARIN SODIUM 5000 UNITS: 5000 INJECTION INTRAVENOUS; SUBCUTANEOUS at 06:18

## 2022-07-12 NOTE — NURSING NOTE
Patient discharged to home with all personal belongings  AVS reviewed with patient with no questions, she walked out of floor and her son offered transportation

## 2022-07-12 NOTE — PLAN OF CARE
Problem: PAIN - ADULT  Goal: Verbalizes/displays adequate comfort level or baseline comfort level  Description: Interventions:  - Encourage patient to monitor pain and request assistance  - Assess pain using appropriate pain scale  - Administer analgesics based on type and severity of pain and evaluate response  - Implement non-pharmacological measures as appropriate and evaluate response  - Consider cultural and social influences on pain and pain management  - Notify physician/advanced practitioner if interventions unsuccessful or patient reports new pain  Outcome: Completed     Problem: DISCHARGE PLANNING  Goal: Discharge to home or other facility with appropriate resources  Description: INTERVENTIONS:  - Identify barriers to discharge w/patient and caregiver  - Arrange for needed discharge resources and transportation as appropriate  - Identify discharge learning needs (meds, wound care, etc )  - Arrange for interpretive services to assist at discharge as needed  - Refer to Case Management Department for coordinating discharge planning if the patient needs post-hospital services based on physician/advanced practitioner order or complex needs related to functional status, cognitive ability, or social support system  Outcome: Completed

## 2022-07-12 NOTE — PROGRESS NOTES
Progress Note - General Surgery   Mariposa Santiago 73 y o  female MRN: [id number]  Unit/Bed#: 2 [place] [address] Encounter: [id number]    Assessment:  POD#1 s/p lap appy  Pain well controlled, tolerating diet, passing flatus and urinating with no difficulties  Plan:  Patient is ready for discharge  Follow up in 10-14 days with Dr Genny Shafer  Dicussed with patient discharge instructions, wound care, follow up, and when to follow up sooner at length  All questions answered to satisfactory  Patient verbalizes understanding and agrees with treatment plan  Subjective/Objective   Chief Complaint:" I feel good "    Subjective: Patient was seen and examined bedside  Patient reports no acute changes through the night  Patient reports tolerating diet with no difficulties  Pain is well controlled  Passing flatus and voiding spontaneously  Objective:  Blood pressure 129/71, pulse 60, temperature 98 1 °F (36 7 °C), temperature source Oral, resp  rate 17, height 5' 5" (1 651 m), weight 70 8 kg (156 lb), SpO2 96 %, not currently breastfeeding  ,Body mass index is 25 96 kg/m²  Intake/Output Summary (Last 24 hours) at 7/12/2022 1257  Last data filed at 7/11/2022 1841  Gross per 24 hour   Intake 50 ml   Output --   Net 50 ml       Invasive Devices  Report    None                 Physical Exam: /71 (BP Location: Left arm)   Pulse 60   Temp 98 1 °F (36 7 °C) (Oral)   Resp 17   Ht 5' 5" (1 651 m)   Wt 70 8 kg (156 lb)   SpO2 96%   BMI 25 96 kg/m²   General appearance: alert and oriented, in no acute distress  Lungs: clear to auscultation bilaterally  Heart: regular rate and rhythm, S1, S2 normal, no murmur, click, rub or gallop  Abdomen: soft, non-tender; bowel sounds normal; no masses,  no organomegaly and abdominal incision c/d/i  Skin: Skin color, texture, turgor normal  No rashes or lesions    Lab, Imaging and other studies:  I have personally reviewed pertinent lab results    , CBC:   Lab Results   Component Value Date    WBC 10 18 (H) 07/12/2022    HGB 10 6 (L) 07/12/2022    HCT 32 6 (L) 07/12/2022    MCV 93 07/12/2022     07/12/2022    MCH 30 3 07/12/2022    MCHC 32 5 07/12/2022    RDW 13 2 07/12/2022    MPV 9 4 07/12/2022   , CMP: No results found for: SODIUM, K, CL, CO2, ANIONGAP, BUN, CREATININE, GLUCOSE, CALCIUM, AST, ALT, ALKPHOS, PROT, BILITOT, EGFR  VTE Pharmacologic Prophylaxis: Heparin  VTE Mechanical Prophylaxis: sequential compression device

## 2022-07-16 LAB
ATRIAL RATE: 88 BPM
P AXIS: 17 DEGREES
PR INTERVAL: 158 MS
QRS AXIS: -10 DEGREES
QRSD INTERVAL: 100 MS
QT INTERVAL: 390 MS
QTC INTERVAL: 471 MS
T WAVE AXIS: -12 DEGREES
VENTRICULAR RATE: 88 BPM

## 2022-07-16 PROCEDURE — 93010 ELECTROCARDIOGRAM REPORT: CPT | Performed by: INTERNAL MEDICINE

## 2022-07-26 ENCOUNTER — OFFICE VISIT (OUTPATIENT)
Dept: SURGERY | Facility: CLINIC | Age: 67
End: 2022-07-26

## 2022-07-26 VITALS — BODY MASS INDEX: 29.34 KG/M2 | HEIGHT: 61 IN | WEIGHT: 155.4 LBS | TEMPERATURE: 98.2 F

## 2022-07-26 DIAGNOSIS — Z09 POSTOPERATIVE EXAMINATION: Primary | ICD-10-CM

## 2022-07-26 PROCEDURE — 99024 POSTOP FOLLOW-UP VISIT: CPT | Performed by: SURGERY

## 2022-07-26 NOTE — PROGRESS NOTES
Patient is status post laparoscopic appendectomy 11 July 2022  Patient has minimal pain complaints and no wound issues  Pathology report:  Final Diagnosis   A  Appendix:  - Acute appendicitis and acute periappendicitis  - No evidence of neoplasia or malignancy  Incisions healing well  No signs of infection  Patient counseled  Follow up p r n

## 2023-06-14 ENCOUNTER — OFFICE VISIT (OUTPATIENT)
Dept: OBGYN CLINIC | Facility: CLINIC | Age: 68
End: 2023-06-14
Payer: COMMERCIAL

## 2023-06-14 VITALS
SYSTOLIC BLOOD PRESSURE: 120 MMHG | HEART RATE: 79 BPM | WEIGHT: 155.6 LBS | DIASTOLIC BLOOD PRESSURE: 71 MMHG | BODY MASS INDEX: 29.38 KG/M2 | HEIGHT: 61 IN

## 2023-06-14 DIAGNOSIS — M17.11 PRIMARY OSTEOARTHRITIS OF RIGHT KNEE: ICD-10-CM

## 2023-06-14 DIAGNOSIS — M16.11 PRIMARY OSTEOARTHRITIS OF ONE HIP, RIGHT: Primary | ICD-10-CM

## 2023-06-14 PROCEDURE — 99213 OFFICE O/P EST LOW 20 MIN: CPT | Performed by: ORTHOPAEDIC SURGERY

## 2023-06-14 NOTE — PROGRESS NOTES
Assessment/Plan:  1  Primary osteoarthritis of one hip, right        2  Primary osteoarthritis of right knee          Scribe Attestation    I,:  Dalia Grullon PA-C am acting as a scribe while in the presence of the attending physician :       I,:  Judd Sprague, DO personally performed the services described in this documentation    as scribed in my presence :         [facility] is a very pleasant 81-year-old presenting today for follow-up of her right hip and knee osteoarthritis  She has done exceptionally well with conservative treatment in the form of physical therapy and home exercises  She is asymptomatic with activities of daily living and enjoyment with only occasional soreness at night  We discussed that she should continue her efforts at the gym and may take Tylenol as needed if she has any discomfort at night  Since that she is doing so well, she can now follow-up on an as-needed basis  She expressed understanding and all of her questions were addressed today    Subjective: Right hip and knee follow-up    Patient ID: Jeovanny Cabrera is a 78 y o  female presenting today for follow-up 2 months after her initial appointment for her right hip and knee osteoarthritis  She reports that she is now doing exceptionally well is asymptomatic  She went to physical therapy, but they referred her to the gym for exercises as she did not need formal physical therapy  She denies any pain in the hip or knee with activities of daily living or enjoyment  She occasionally has some soreness at the end of the day, but has not required any Tylenol or NSAIDs  She is very pleased with her response to exercises    Review of Systems   Constitutional: Negative  HENT: Negative  Eyes: Negative  Respiratory: Negative  Cardiovascular: Negative  Gastrointestinal: Negative  Endocrine: Negative  Genitourinary: Negative  Musculoskeletal: Negative  Skin: Negative  Allergic/Immunologic: Negative  Neurological: Negative  Hematological: Negative  Psychiatric/Behavioral: Negative  Past Medical History:   Diagnosis Date   • Acid reflux    • GERD (gastroesophageal reflux disease)        Past Surgical History:   Procedure Laterality Date   • ABDOMINAL SURGERY     • APPENDECTOMY LAPAROSCOPIC N/A 7/11/2022    Procedure: APPENDECTOMY LAPAROSCOPIC;  Surgeon: Addis Barba MD;  Location: [address] [place] [address];  Service: General   • BREAST SURGERY Left 1975    lumpectomy, benign   • COLONOSCOPY N/A 01/12/2017    Procedure: COLONOSCOPY;  Surgeon: Harry Arboleda MD;  Location: [facility]; Service:    • ESOPHAGOGASTRODUODENOSCOPY N/A 01/12/2017    Procedure: ESOPHAGOGASTRODUODENOSCOPY (EGD); Surgeon: Harry Arboleda MD;  Location: [facility] GI [facility];   Service:    • FRACTURE SURGERY      nose   • GASTRIC BYPASS  2011   • HYSTERECTOMY     • JOINT REPLACEMENT Left     knee   • DC CORRJ HALLUX VALGUS W/SESMDC W/DIST METAR OSTEOT Right 01/11/2016    Procedure: Charmayne Johns with Internal Fixation Right Foot;  Surgeon: Jose Angel Branch DPM;  Location: [facility];  Service: Podiatry   • ROOSEVELT-EN-Y PROCEDURE     • TONSILLECTOMY         Family History   Problem Relation Age of Onset   • COPD Mother    • COPD Father    • COPD Brother    • Diabetes Maternal Grandmother    • Cancer Paternal Grandmother         ovarian   • No Known Problems Sister    • No Known Problems Maternal Aunt    • No Known Problems Maternal Uncle    • No Known Problems Paternal Aunt    • No Known Problems Paternal Uncle    • No Known Problems Maternal Grandfather    • No Known Problems Paternal Grandfather    • ADD / ADHD Neg Hx    • Anesthesia problems Neg Hx    • Clotting disorder Neg Hx    • Collagen disease Neg Hx    • Dislocations Neg Hx    • Learning disabilities Neg Hx    • Neurological problems Neg Hx    • Osteoporosis Neg Hx    • Rheumatologic disease Neg Hx    • Scoliosis Neg Hx    • Vascular Disease Neg Hx        Social History Occupational History   • Not on file   Tobacco Use   • Smoking status: Never   • Smokeless tobacco: Never   Vaping Use   • Vaping Use: Never used   Substance and Sexual Activity   • Alcohol use: Yes     Comment: socially   • Drug use: No   • Sexual activity: Not Currently         Current Outpatient Medications:   •  CALCIUM PO, Take by mouth, Disp: , Rfl:   •  cyanocobalamin (VITAMIN B-12) 500 mcg tablet, Take 500 mcg by mouth daily  , Disp: , Rfl:   •  Garlic 0012 MG CAPS, Take by mouth, Disp: , Rfl:   •  multivitamins-fortified A-D-K (SOURCECF) solution, Take 0 5 mL by mouth daily  , Disp: , Rfl:   •  omega-3-acid ethyl esters (LOVAZA) 1 g capsule, Take 2 g by mouth 2 (two) times a day, Disp: , Rfl:   •  VITAMIN D, ERGOCALCIFEROL, PO, Take by mouth, Disp: , Rfl:     Allergies   Allergen Reactions   • Nsaids Itching   • Oxycodone-Acetaminophen Itching   • Celecoxib    • Hydrocodone-Acetaminophen Itching   • Meloxicam    • Adhesive [Medical Tape] Rash       Objective:  Vitals:    06/14/23 0913   BP: 120/71   Pulse: 79       Body mass index is 29 4 kg/m²  Right Knee Exam     Muscle Strength   The patient has normal right knee strength  Tenderness   The patient is experiencing no tenderness  Range of Motion   Extension:  0 normal   Flexion:  120 normal     Tests   Varus: negative Valgus: negative  Drawer:  Anterior - negative    Posterior - negative  Patellar apprehension: negative    Other   Erythema: absent  Scars: absent  Sensation: normal  Pulse: present  Swelling: none  Effusion: no effusion present    Comments:  Patellar crepitance with passive flexion and extension  Negative PF grind  Collateral ligaments stable at 0, 30, 90  Thigh and calf soft and nontender      Right Hip Exam     Tenderness   The patient is experiencing no tenderness       Range of Motion   Abduction:  40 normal   Adduction:  20 normal   Extension:  0 normal   Flexion:  120 normal   External rotation:  50 normal   Internal rotation:  20 normal     Muscle Strength   Abduction: 5/5   Adduction: 5/5   Flexion: 5/5     Tests   JOSE: negative  Jurgen: negative    Other   Erythema: absent  Scars: absent  Sensation: normal  Pulse: present    Comments:  Ambulates with symmetrical gait without assistive device          Observations     Right Knee   Negative for effusion  Physical Exam  Vitals and nursing note reviewed  Constitutional:       Appearance: Normal appearance  She is well-developed  Comments: Body mass index is 29 4 kg/m²  HENT:      Head: Normocephalic and atraumatic  Right Ear: External ear normal       Left Ear: External ear normal    Eyes:      Extraocular Movements: Extraocular movements intact  Conjunctiva/sclera: Conjunctivae normal    Cardiovascular:      Rate and Rhythm: Normal rate  Pulses: Normal pulses  Pulmonary:      Effort: Pulmonary effort is normal    Musculoskeletal:      Cervical back: Normal range of motion  Right knee: No effusion  Comments: See ortho exam   Skin:     General: Skin is warm and dry  Neurological:      General: No focal deficit present  Mental Status: She is alert and oriented to person, place, and time  Mental status is at baseline  Psychiatric:         Mood and Affect: Mood normal          Behavior: Behavior normal          Thought Content: Thought content normal          Judgment: Judgment normal          This document was created using speech voice recognition software  Grammatical errors, random word insertions, pronoun errors, and incomplete sentences are an occasional consequence of this system due to software limitations, ambient noise, and hardware issues  Any formal questions or concerns about content, text, or information contained within the body of this dictation should be directly addressed to the provider for clarification

## 2023-07-19 ENCOUNTER — OFFICE VISIT (OUTPATIENT)
Dept: FAMILY MEDICINE CLINIC | Facility: CLINIC | Age: 68
End: 2023-07-19
Payer: COMMERCIAL

## 2023-07-19 VITALS
HEART RATE: 84 BPM | TEMPERATURE: 98.2 F | RESPIRATION RATE: 16 BRPM | OXYGEN SATURATION: 97 % | SYSTOLIC BLOOD PRESSURE: 124 MMHG | BODY MASS INDEX: 30.84 KG/M2 | WEIGHT: 153 LBS | HEIGHT: 59 IN | DIASTOLIC BLOOD PRESSURE: 80 MMHG

## 2023-07-19 DIAGNOSIS — Z12.31 ENCOUNTER FOR SCREENING MAMMOGRAM FOR BREAST CANCER: ICD-10-CM

## 2023-07-19 DIAGNOSIS — E78.5 HYPERLIPIDEMIA, UNSPECIFIED HYPERLIPIDEMIA TYPE: Primary | ICD-10-CM

## 2023-07-19 DIAGNOSIS — M65.312 TRIGGER FINGER OF LEFT THUMB: ICD-10-CM

## 2023-07-19 DIAGNOSIS — M70.71 BURSITIS OF RIGHT HIP, UNSPECIFIED BURSA: ICD-10-CM

## 2023-07-19 DIAGNOSIS — M54.50 LUMBAR SPINE PAIN: ICD-10-CM

## 2023-07-19 DIAGNOSIS — M79.672 LEFT FOOT PAIN: ICD-10-CM

## 2023-07-19 PROBLEM — Z98.84 GASTRIC BYPASS STATUS FOR OBESITY: Status: ACTIVE | Noted: 2023-07-19

## 2023-07-19 PROCEDURE — 99204 OFFICE O/P NEW MOD 45 MIN: CPT | Performed by: FAMILY MEDICINE

## 2023-07-19 NOTE — PROGRESS NOTES
Romulo Delgado 1955 female MRN: [id number]    FAMILY PRACTICE OFFICE VISIT  [facility] - [facility] [address]      ASSESSMENT/PLAN  Romulo Delgado is a 78 y.o. female presents to the office for    Diagnoses and all orders for this visit:    Hyperlipidemia, unspecified hyperlipidemia type    Lumbar spine pain    Encounter for screening mammogram for breast cancer  -     Mammo screening bilateral w 3d & cad; Future    Trigger finger of left thumb  -     Ambulatory Referral to Orthopedic Surgery; Future    Left foot pain  -     Ambulatory Referral to Podiatry; Future    Bursitis of right hip, unspecified bursa    Right hip bursitis education given on Voltaren gel as needed. Home exercises discussed. Referred to podiatry  Referred to Ortho surgery for hand  Mammogram order given recommend getting at [facility] where she normally goes lumbar back pain currently at baseline  Hyperlipidemia r reviewed all labs    BMI Counseling: Body mass index is 30.9 kg/m². The BMI is above normal. Nutrition recommendations include consuming healthier snacks and limiting drinks that contain sugar. Exercise recommendations include exercising 3-5 times per week. Rationale for BMI follow-up plan is due to patient being overweight or obese. Depression Screening and Follow-up Plan: Patient was screened for depression during today's encounter. They screened negative with a PHQ-2 score of 0. No future appointments. SUBJECTIVE  CC: Establish Care (Left trigger thumb )      HPI:  Romulo Delgado is a 78 y.o. female who presents for an establish care appointment. Patient with a history of hyperlipidemia currently not on any medications besides supplements over-the-counter. Patient has a history of lumbar back pain that was being managed previously by another surgeon. Currently she states that she has had ongoing hip pain specifically over the right side of her hip.   Went to physical therapy who stated that she was not a candidate for PT and a surgeon stating that she did not need surgery. Patient states that she also reports that she has left trigger finger. And also foot pain that she would like to discuss with the podiatrist if possibleReview of Systems   Constitutional: Negative for activity change, appetite change, chills, fatigue and fever. HENT: Negative for congestion. Respiratory: Negative for cough, chest tightness and shortness of breath. Cardiovascular: Negative for chest pain and leg swelling. Gastrointestinal: Negative for abdominal distention, abdominal pain, constipation, diarrhea, nausea and vomiting. Musculoskeletal: Positive for arthralgias. All other systems reviewed and are negative. Historical Information   The patient history was reviewed as follows:  Past Medical History:   Diagnosis Date   • Acid reflux    • GERD (gastroesophageal reflux disease)          Medications:     Current Outpatient Medications:   •  CALCIUM PO, Take by mouth, Disp: , Rfl:   •  cyanocobalamin (VITAMIN B-12) 500 mcg tablet, Take 500 mcg by mouth daily. , Disp: , Rfl:   •  Garlic 9956 MG CAPS, Take by mouth, Disp: , Rfl:   •  multivitamins-fortified A-D-K (SOURCECF) solution, Take 0.5 mL by mouth daily. , Disp: , Rfl:   •  omega-3-acid ethyl esters (LOVAZA) 1 g capsule, Take 2 g by mouth 2 (two) times a day, Disp: , Rfl:   •  VITAMIN D, ERGOCALCIFEROL, PO, Take by mouth, Disp: , Rfl:     Allergies   Allergen Reactions   • Nsaids Itching   • Oxycodone-Acetaminophen Itching   • Celecoxib    • Hydrocodone-Acetaminophen Itching   • Meloxicam    • Adhesive [Medical Tape] Rash       OBJECTIVE  Vitals:   Vitals:    07/19/23 1306   BP: 124/80   BP Location: Right arm   Patient Position: Sitting   Cuff Size: Standard   Pulse: 84   Resp: 16   Temp: 98.2 °F (36.8 °C)   SpO2: 97%   Weight: 69.4 kg (153 lb)   Height: 4' 11" (1.499 m)         Physical Exam  Vitals reviewed.    Constitutional: Appearance: She is well-developed. HENT:      Head: Normocephalic and atraumatic. Eyes:      Conjunctiva/sclera: Conjunctivae normal.      Pupils: Pupils are equal, round, and reactive to light. Cardiovascular:      Rate and Rhythm: Normal rate and regular rhythm. Heart sounds: Normal heart sounds. Pulmonary:      Effort: Pulmonary effort is normal. No respiratory distress. Breath sounds: Normal breath sounds. Musculoskeletal:         General: Normal range of motion. Cervical back: Normal range of motion and neck supple. Comments: Trigger noticeable on the left thumb. Right-sided hip bursitis noted on exam full range of motion of the actual joint   Skin:     General: Skin is warm. Capillary Refill: Capillary refill takes less than 2 seconds. Neurological:      Mental Status: She is alert and oriented to person, place, and time.                     Shirley Burgos MD,   [facility]  7/19/2023

## 2023-07-20 ENCOUNTER — TELEPHONE (OUTPATIENT)
Dept: ADMINISTRATIVE | Facility: OTHER | Age: 68
End: 2023-07-20

## 2023-07-20 NOTE — TELEPHONE ENCOUNTER
----- Message from Karen Tariq sent at 7/19/2023  1:12 PM EDT -----  Regarding: mammogram  07/19/23 1:12 PM    Hello, our patient attached above has had Mammogram completed/performed. Please assist in updating the patient chart by making an External outreach to [address] [place] [address]  facility located in [facility] [place]. The date of service is 2022.     Thank you,  [facility]

## 2023-07-20 NOTE — LETTER
2nd Request        Procedure Request Form: Mammogram      Date Requested: 23  Patient: Linda Donato  Patient : 1955   Referring Provider: Cheryl Greer MD        Date of Procedure ______________________________       The above patient has informed us that they have completed their   most recent Mammogram at your facility. Please complete   this form and attach all corresponding procedure reports/results. Comments __________________________________________________________  ____________________________________________________________________  ____________________________________________________________________  ____________________________________________________________________    Facility Completing Procedure _________________________________________    Form Completed By (print name) _______________________________________      Signature __________________________________________________________      These reports are needed for  compliance. Please fax this completed form and a copy of the procedure report to our office located at [address] [place] [address] as soon as possible to Fax [phone] attention Swati: Phone [phone]    We thank you for your assistance in treating our mutual patient.

## 2023-07-20 NOTE — TELEPHONE ENCOUNTER
Upon review of the In Basket request and the patient's chart, initial outreach has been made via fax to facility. Please see Contacts section for details.      Thank you  Judith Robbins MA

## 2023-07-20 NOTE — LETTER
Procedure Request Form: Mammogram      Date Requested: 23  Patient: Cleaster Mustache  Patient : 1955   Referring Provider: Curly Fabry, MD        Date of Procedure ______________________________       The above patient has informed us that they have completed their   most recent Mammogram at your facility. Please complete   this form and attach all corresponding procedure reports/results. Comments __________________________________________________________  ____________________________________________________________________  ____________________________________________________________________  ____________________________________________________________________    Facility Completing Procedure _________________________________________    Form Completed By (print name) _______________________________________      Signature __________________________________________________________      These reports are needed for  compliance. Please fax this completed form and a copy of the procedure report to our office located at [address] [place] [address] as soon as possible to Fax [phone] attention Swati: Phone [phone]    We thank you for your assistance in treating our mutual patient.

## 2023-07-25 NOTE — TELEPHONE ENCOUNTER
As a follow-up, a second attempt has been made for outreach via fax to facility. Please see Contacts section for details.     Thank you  Shekhar Corona MA

## 2023-08-04 ENCOUNTER — TELEPHONE (OUTPATIENT)
Dept: FAMILY MEDICINE CLINIC | Facility: CLINIC | Age: 68
End: 2023-08-04

## 2023-08-04 NOTE — TELEPHONE ENCOUNTER
Patient called stating that Claire Palacios called her so she was returning her call. I reviewed pts chart but I do not see why Patient was called. Please advise once you return.

## 2023-08-15 ENCOUNTER — TELEPHONE (OUTPATIENT)
Dept: FAMILY MEDICINE CLINIC | Facility: CLINIC | Age: 68
End: 2023-08-15

## 2023-08-15 DIAGNOSIS — M79.671 RIGHT FOOT PAIN: ICD-10-CM

## 2023-08-15 NOTE — TELEPHONE ENCOUNTER
Patient called and stated was seen in the office a couple of weeks ago and was given a  Referral for the left foot. Patient is requesting a referral  for the right foot. Please advise.

## 2023-08-21 ENCOUNTER — OFFICE VISIT (OUTPATIENT)
Dept: OBGYN CLINIC | Facility: CLINIC | Age: 68
End: 2023-08-21
Payer: COMMERCIAL

## 2023-08-21 VITALS
BODY MASS INDEX: 30.64 KG/M2 | WEIGHT: 152 LBS | DIASTOLIC BLOOD PRESSURE: 72 MMHG | SYSTOLIC BLOOD PRESSURE: 114 MMHG | HEART RATE: 80 BPM | HEIGHT: 59 IN

## 2023-08-21 DIAGNOSIS — M65.312 TRIGGER FINGER OF LEFT THUMB: ICD-10-CM

## 2023-08-21 PROCEDURE — 99213 OFFICE O/P EST LOW 20 MIN: CPT | Performed by: ORTHOPAEDIC SURGERY

## 2023-08-21 PROCEDURE — 20550 NJX 1 TENDON SHEATH/LIGAMENT: CPT | Performed by: ORTHOPAEDIC SURGERY

## 2023-08-21 RX ADMIN — BETAMETHASONE SODIUM PHOSPHATE AND BETAMETHASONE ACETATE 6 MG: 3; 3 INJECTION, SUSPENSION INTRA-ARTICULAR; INTRALESIONAL; INTRAMUSCULAR; SOFT TISSUE at 13:30

## 2023-08-21 RX ADMIN — LIDOCAINE HYDROCHLORIDE 1 ML: 10 INJECTION, SOLUTION INFILTRATION; PERINEURAL at 13:30

## 2023-08-21 NOTE — PROGRESS NOTES
Assessment/Plan:  1. Trigger finger of left thumb  Ambulatory Referral to Orthopedic Surgery    Hand/upper extremity injection          • Physical examination performed, diagnostic imaging review, and thorough subjective history obtained at today's visit  • Treatment options were discussed which included nonoperative and operative treatment. Operative treatment is reserved for when nonoperative management has not been successful. • Nonoperative treatment includes splinting, therapy, use of NSAIDs (oral or topical), and injection. Risk, benefits, and realistic expectations following nonoperative treatment were discussed. • The patient was given an opportunity to ask questions. Questions were answered to her satisfaction. • Through shared decision making, the patient decided to move forward with  corticosteroid injection of the left thumb. • Follow up as needed      Hand/upper extremity injection  Universal Protocol:  Consent: Verbal consent obtained. Risks and benefits: risks, benefits and alternatives were discussed  Consent given by: patient  Time out: Immediately prior to procedure a "time out" was called to verify the correct patient, procedure, equipment, support staff and site/side marked as required. Patient understanding: patient states understanding of the procedure being performed  Site marked: the operative site was marked  Patient identity confirmed: verbally with patient    Supporting Documentation  Indications: tendon swelling   Procedure Details  Condition:trigger finger Preparation: Patient was prepped and draped in the usual sterile fashion  Needle size: 25 G  Ultrasound guidance: no  Medications administered: 1 mL lidocaine 1 %; 6 mg betamethasone acetate-betamethasone sodium phosphate 6 (3-3) mg/mL    Patient tolerance: patient tolerated the procedure well with no immediate complications  Dressing:  Sterile dressing applied         cc:  My left thumb is triggering    Subjective:   Heidi ANDERSON Cristin Taylor is a left hand dominant 78 y.o. female who presents with triggering of the left thumb. She has previous history of trigger thumb on the contralateral side that Dr. Aj Luu treated back in 2021. She recalls the left thumb has been triggering for approximately 5 months. She was interested in having an injection. Review of Systems   Constitutional: Negative for chills and fever. HENT: Negative for ear pain and sore throat. Eyes: Negative for pain and visual disturbance. Respiratory: Negative for cough and shortness of breath. Cardiovascular: Negative for chest pain and palpitations. Gastrointestinal: Negative for abdominal pain and vomiting. Genitourinary: Negative for dysuria and hematuria. Musculoskeletal: Positive for arthralgias (left thumb). Negative for back pain. Skin: Negative for color change and rash. Neurological: Negative for seizures and syncope. All other systems reviewed and are negative. Past Medical History:   Diagnosis Date   • Acid reflux    • GERD (gastroesophageal reflux disease)        Past Surgical History:   Procedure Laterality Date   • ABDOMINAL SURGERY     • APPENDECTOMY LAPAROSCOPIC N/A 7/11/2022    Procedure: APPENDECTOMY LAPAROSCOPIC;  Surgeon: Regan Collado MD;  Location: [facility];  Service: General   • BREAST SURGERY Left 1975    lumpectomy, benign   • COLONOSCOPY N/A 01/12/2017    Procedure: COLONOSCOPY;  Surgeon: Junior Abbott MD;  Location: [facility]; Service:    • ESOPHAGOGASTRODUODENOSCOPY N/A 01/12/2017    Procedure: ESOPHAGOGASTRODUODENOSCOPY (EGD); Surgeon: Junior Abbott MD;  Location: [facility];   Service:    • FRACTURE SURGERY      nose   • GASTRIC BYPASS  2011   • HYSTERECTOMY     • JOINT REPLACEMENT Left     knee   • GA CORRJ HALLUX VALGUS W/SESMDC W/DIST METAR OSTEOT Right 01/11/2016    Procedure: Rogena Cooks with Internal Fixation Right Foot;  Surgeon: Britton Ramey DPM;  Location: [facility];  Service: Podiatry • ROOSEVELT-EN-Y PROCEDURE     • TONSILLECTOMY         Family History   Problem Relation Age of Onset   • COPD Mother    • COPD Father    • COPD Brother    • Diabetes Maternal Grandmother    • Cancer Paternal Grandmother         ovarian   • No Known Problems Sister    • No Known Problems Maternal Aunt    • No Known Problems Maternal Uncle    • No Known Problems Paternal Aunt    • No Known Problems Paternal Uncle    • No Known Problems Maternal Grandfather    • No Known Problems Paternal Grandfather    • ADD / ADHD Neg Hx    • Anesthesia problems Neg Hx    • Clotting disorder Neg Hx    • Collagen disease Neg Hx    • Dislocations Neg Hx    • Learning disabilities Neg Hx    • Neurological problems Neg Hx    • Osteoporosis Neg Hx    • Rheumatologic disease Neg Hx    • Scoliosis Neg Hx    • Vascular Disease Neg Hx        Social History     Occupational History   • Not on file   Tobacco Use   • Smoking status: Never   • Smokeless tobacco: Never   Vaping Use   • Vaping Use: Never used   Substance and Sexual Activity   • Alcohol use: Yes     Comment: socially   • Drug use: No   • Sexual activity: Not Currently         Current Outpatient Medications:   •  CALCIUM PO, Take by mouth, Disp: , Rfl:   •  cyanocobalamin (VITAMIN B-12) 500 mcg tablet, Take 500 mcg by mouth daily. , Disp: , Rfl:   •  Garlic 0532 MG CAPS, Take by mouth, Disp: , Rfl:   •  multivitamins-fortified A-D-K (SOURCECF) solution, Take 0.5 mL by mouth daily. , Disp: , Rfl:   •  omega-3-acid ethyl esters (LOVAZA) 1 g capsule, Take 2 g by mouth 2 (two) times a day, Disp: , Rfl:   •  VITAMIN D, ERGOCALCIFEROL, PO, Take by mouth, Disp: , Rfl:     Allergies   Allergen Reactions   • Nsaids Itching   • Oxycodone-Acetaminophen Itching   • Celecoxib    • Hydrocodone-Acetaminophen Itching   • Meloxicam    • Adhesive [Medical Tape] Rash       Objective:  Vitals:    08/21/23 1317   BP: 114/72   Pulse: 80       The patient was awake, alert, and oriented to person, place, and time.  No acute distress. Normocephalic. EOMI. Mucous membranes moist.  Normal respiratory effort. Examination of the affected extremity was compared to the unaffected extremity. Skin was intact. No swelling or ecchymosis. No deformity. Hand and fingers were warm and well-perfused. Capillary refill was brisk. Full active range of motion of the elbows, forearms, wrists, and fingers. 5/5  wrist flexor/extensors and . The left thumb was not postured in a flexed position. There was tenderness at the level of the A1 pulley. There was crepitus with flexion and extension of the finger. Catching/locking was observed during the examination. Imaging/Diagnostic Studies:    No pertinent imaging to review. Scribe Attestation    I,:  Capri Singleton am acting as a scribe while in the presence of the attending physician.:       I,:  Ciro Mohamud MD personally performed the services described in this documentation    as scribed in my presence.:         This document was created using speech voice recognition software. Grammatical errors, random word insertions, pronoun errors, and incomplete sentences are an occasional consequence of this system due to software limitations, ambient noise, and hardware issues. Any formal questions or concerns about content, text, or information contained within the body of this dictation should be directly addressed to the provider for clarification.

## 2023-08-22 RX ORDER — BETAMETHASONE SODIUM PHOSPHATE AND BETAMETHASONE ACETATE 3; 3 MG/ML; MG/ML
6 INJECTION, SUSPENSION INTRA-ARTICULAR; INTRALESIONAL; INTRAMUSCULAR; SOFT TISSUE
Status: COMPLETED | OUTPATIENT
Start: 2023-08-21 | End: 2023-08-21

## 2023-08-22 RX ORDER — LIDOCAINE HYDROCHLORIDE 10 MG/ML
1 INJECTION, SOLUTION INFILTRATION; PERINEURAL
Status: COMPLETED | OUTPATIENT
Start: 2023-08-21 | End: 2023-08-21

## 2023-09-14 ENCOUNTER — OFFICE VISIT (OUTPATIENT)
Dept: PODIATRY | Facility: CLINIC | Age: 68
End: 2023-09-14
Payer: COMMERCIAL

## 2023-09-14 VITALS
DIASTOLIC BLOOD PRESSURE: 72 MMHG | WEIGHT: 152 LBS | BODY MASS INDEX: 30.64 KG/M2 | SYSTOLIC BLOOD PRESSURE: 114 MMHG | RESPIRATION RATE: 16 BRPM | HEIGHT: 59 IN

## 2023-09-14 DIAGNOSIS — M25.571 ARTHRALGIA OF RIGHT FOOT: ICD-10-CM

## 2023-09-14 DIAGNOSIS — M21.41 ACQUIRED FLAT FOOT, RIGHT: ICD-10-CM

## 2023-09-14 DIAGNOSIS — M21.962 ACQUIRED DEFORMITY OF LEFT FOOT: ICD-10-CM

## 2023-09-14 DIAGNOSIS — M21.961 ACQUIRED DEFORMITY OF RIGHT FOOT: Primary | ICD-10-CM

## 2023-09-14 DIAGNOSIS — M21.42 ACQUIRED FLAT FOOT, LEFT: ICD-10-CM

## 2023-09-14 DIAGNOSIS — G57.61 MORTON'S NEUROMA OF SECOND INTERSPACE OF RIGHT FOOT: ICD-10-CM

## 2023-09-14 PROCEDURE — 20605 DRAIN/INJ JOINT/BURSA W/O US: CPT | Performed by: PODIATRIST

## 2023-09-14 PROCEDURE — 73620 X-RAY EXAM OF FOOT: CPT | Performed by: PODIATRIST

## 2023-09-14 PROCEDURE — 99203 OFFICE O/P NEW LOW 30 MIN: CPT | Performed by: PODIATRIST

## 2023-09-14 PROCEDURE — L3000 FT INSERT UCB BERKELEY SHELL: HCPCS | Performed by: PODIATRIST

## 2023-09-14 NOTE — PROGRESS NOTES
Assessment/Plan: Metatarsalgia. Arthralgia second MPJ right foot. Rule out neuroma. Acquire deformity foot bilateral.  Acquired pes planus. Plan. Chart reviewed. X-rays taken reviewed. Today arthrocentesis done. 1 cc Kenalog 10 injected right second MPJ without complication. Patient will take Tylenol as needed. Patient would benefit from pronation control. If you have been casted for custom molded foot orthotics. These will control deformity and ease pain. 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 This has been ongoing. No history of trauma. Of note. Patient has history of right bunionectomy several years prior. She has used orthotics. Allergies   Allergen Reactions   • Nsaids Itching   • Oxycodone-Acetaminophen Itching   • Celecoxib    • Hydrocodone-Acetaminophen Itching   • Meloxicam    • Adhesive [Medical Tape] Rash         Current Outpatient Medications:   •  CALCIUM PO, Take by mouth, Disp: , Rfl:   •  cyanocobalamin (VITAMIN B-12) 500 mcg tablet, Take 500 mcg by mouth daily. , Disp: , Rfl:   •  Garlic 9914 MG CAPS, Take by mouth, Disp: , Rfl:   •  multivitamins-fortified A-D-K (SOURCECF) solution, Take 0.5 mL by mouth daily. , Disp: , Rfl:   •  omega-3-acid ethyl esters (LOVAZA) 1 g capsule, Take 2 g by mouth 2 (two) times a day, Disp: , Rfl:   •  VITAMIN D, ERGOCALCIFEROL, PO, Take by mouth, Disp: , Rfl:     Patient Active Problem List   Diagnosis   • Acute appendicitis   • Gastroesophageal reflux disease   • Primary osteoarthritis of one hip, right   • Primary osteoarthritis of right knee   • Gastric bypass status for obesity          Patient ID: Clyda File is a 78 y.o. female. HPI    The following portions of the patient's history were reviewed and updated as appropriate:     family history includes COPD in her brother, father, and mother; Cancer in her paternal grandmother; Diabetes in her maternal grandmother; No Known Problems in her maternal aunt, maternal grandfather, maternal uncle, paternal aunt, paternal grandfather, paternal uncle, and sister. reports that she has never smoked. She has never used smokeless tobacco. She reports current alcohol use. She reports that she does not use drugs. Vitals:    09/14/23 1136   BP: 114/72   Resp: 16       Review of Systems      Objective:  Patient's shoes and socks removed. Foot Exam    General  General Appearance: appears stated age and healthy   Orientation: alert and oriented to person, place, and time   Affect: appropriate   Gait: antalgic       Right Foot/Ankle     Inspection and Palpation  Tenderness: bony tenderness, lesser metatarsophalangeal joints and metatarsals   Swelling: dorsum   Arch: pes planus  Hallux limitus: yes    Neurovascular  Dorsalis pedis: 3+  Posterior tibial: 3+      Left Foot/Ankle      Inspection and Palpation  Tenderness: bony tenderness   Swelling: dorsum   Arch: pes planus  Hallux valgus: yes    Neurovascular  Dorsalis pedis: 3+  Posterior tibial: 3+        Physical Exam  Vitals and nursing note reviewed. Constitutional:       Appearance: Normal appearance. Cardiovascular:      Rate and Rhythm: Normal rate and regular rhythm. Pulses:           Dorsalis pedis pulses are 3+ on the right side and 3+ on the left side. Posterior tibial pulses are 3+ on the right side and 3+ on the left side. Musculoskeletal:      Right foot: Bony tenderness present. Left foot: Bunion and bony tenderness present. Feet:      Comments: Patient is pronated in stance and gait. She has metatarsus adductus type foot. Pain with palpation second MPJ right foot.   X-ray demonstrates long second metatarsal.  No evidence of fracture. Osteoarthritis noted midfoot bilateral.  Skin:     Capillary Refill: Capillary refill takes less than 2 seconds. Neurological:      Mental Status: She is alert. Psychiatric:         Mood and Affect: Mood normal.         Behavior: Behavior normal.         Thought Content:  Thought content normal.         Judgment: Judgment normal.

## 2023-10-30 ENCOUNTER — TELEPHONE (OUTPATIENT)
Age: 68
End: 2023-10-30

## 2023-10-30 NOTE — TELEPHONE ENCOUNTER
Caller:Patient    Doctor: Shanna Velasco    Reason for call: Patient is calling stating she received a bill for images from her appt with Dr Shanna Velasco in April because they are stating she didn't have a referral. She stated she brought the referral with her to her appt, and her PCP has sine retired and is unable to get a new one. They advised her to reach out to our office to check on this.     Call back#: [phone]

## 2023-10-30 NOTE — TELEPHONE ENCOUNTER
I sw pt and asked if she still had the referral that we could scan in, she advised she did not and that she will call the PCP office to see of they have it on file. I explained she would have to call billing in regards to the bill and provided golden phone number.      Pt will reach out

## 2023-12-27 ENCOUNTER — TELEPHONE (OUTPATIENT)
Dept: FAMILY MEDICINE CLINIC | Facility: CLINIC | Age: 68
End: 2023-12-27

## 2023-12-27 DIAGNOSIS — Z12.31 ENCOUNTER FOR SCREENING MAMMOGRAM FOR BREAST CANCER: ICD-10-CM

## 2023-12-27 NOTE — TELEPHONE ENCOUNTER
LVM advising that Mammo was normal. Unsure how I got it a month late. Apologized. Advised to repeat in 1 year like normal.

## 2023-12-28 ENCOUNTER — TELEPHONE (OUTPATIENT)
Dept: PODIATRY | Facility: CLINIC | Age: 68
End: 2023-12-28

## 2023-12-28 NOTE — TELEPHONE ENCOUNTER
Caller: Heidi Alcala    Doctor: Norman Juarez DPM    Reason for call: On 9/14/23, Heidi had an orthotic appointment.  She has not heard from the office if her orthotics are there.  She was already billed.  Since she did not hear from the office, she wants to cancel them and she will call the insurance company to have the insurance reimbursed.    Call back:  [phone]

## 2024-01-03 NOTE — TELEPHONE ENCOUNTER
Spoke with Heidi and she wants the orthotic monies sent back to insurance company. She does not want them.

## 2024-04-24 ENCOUNTER — OFFICE VISIT (OUTPATIENT)
Dept: FAMILY MEDICINE CLINIC | Facility: CLINIC | Age: 69
End: 2024-04-24
Payer: COMMERCIAL

## 2024-04-24 VITALS
OXYGEN SATURATION: 99 % | WEIGHT: 156 LBS | BODY MASS INDEX: 31.45 KG/M2 | TEMPERATURE: 97.9 F | DIASTOLIC BLOOD PRESSURE: 60 MMHG | HEART RATE: 78 BPM | SYSTOLIC BLOOD PRESSURE: 120 MMHG | RESPIRATION RATE: 16 BRPM | HEIGHT: 59 IN

## 2024-04-24 DIAGNOSIS — G25.0 ESSENTIAL TREMOR: ICD-10-CM

## 2024-04-24 DIAGNOSIS — M54.16 LUMBAR RADICULOPATHY: ICD-10-CM

## 2024-04-24 DIAGNOSIS — M79.604 RIGHT LEG PAIN: Primary | ICD-10-CM

## 2024-04-24 PROCEDURE — 99214 OFFICE O/P EST MOD 30 MIN: CPT | Performed by: FAMILY MEDICINE

## 2024-04-24 RX ORDER — METHYLPREDNISOLONE 4 MG/1
TABLET ORAL
Qty: 21 EACH | Refills: 0 | Status: SHIPPED | OUTPATIENT
Start: 2024-04-24

## 2024-04-24 RX ORDER — CYCLOBENZAPRINE HCL 10 MG
10 TABLET ORAL
Qty: 30 TABLET | Refills: 0 | Status: SHIPPED | OUTPATIENT
Start: 2024-04-24

## 2024-04-24 NOTE — PROGRESS NOTES
Heidi Alcala 1955 female MRN: [id number]    [facility] OFFICE VISIT  [facility]      ASSESSMENT/PLAN  Heidi Alcala is a 68 y.o. female presents to the office for    Diagnoses and all orders for this visit:    Right leg pain  -     Ambulatory Referral to Physical Therapy; Future  -     Ambulatory referral to Spine & Pain Management; Future  -     methylPREDNISolone 4 MG tablet therapy pack; Use as directed on package  -     cyclobenzaprine (FLEXERIL) 10 mg tablet; Take 1 tablet (10 mg total) by mouth daily at bedtime    Lumbar radiculopathy  -     Ambulatory Referral to Physical Therapy; Future  -     Ambulatory referral to Spine & Pain Management; Future    Essential tremor       Will trial the patient on Flexeril 10 mg at nighttime/Medrol pack.  If no improvement recommend establishing with physical therapy.  Pain management referral given just in case patient decides to go down that avenue.  As for the essential tremor new diagnosis.  Continue to closely monitor at this time.  Triggers discussed with the patient         Future Appointments   Date Time Provider Department Center   7/17/2024  8:00 AM Luly Rizo MD [facility]            SUBJECTIVE  CC: Leg Pain (Pain in the back of right leg radiating to the foot )      HPI:  Heidi Alcala is a 68 y.o. female who presents for an acute appointment.  Patient states that she booked this appointment 3 weeks ago.  Patient states that she has been having right leg pain specifically in the back of her leg.  States that it radiates down to her foot at times.  Patient also has noticed incidentally a tremor that has developed upon drinking her coffee.  Patient chronic history of back pain.    Review of Systems   Constitutional:  Negative for activity change, appetite change, chills, fatigue and fever.   HENT:  Negative for congestion.    Respiratory:  Negative for cough, chest  "tightness and shortness of breath.    Cardiovascular:  Negative for chest pain and leg swelling.   Gastrointestinal:  Negative for abdominal distention, abdominal pain, constipation, diarrhea, nausea and vomiting.   All other systems reviewed and are negative.      Historical Information   The patient history was reviewed as follows:  Past Medical History:   Diagnosis Date   • Acid reflux    • GERD (gastroesophageal reflux disease)          Medications:     Current Outpatient Medications:   •  CALCIUM PO, Take by mouth, Disp: , Rfl:   •  cyanocobalamin (VITAMIN B-12) 500 mcg tablet, Take 500 mcg by mouth daily., Disp: , Rfl:   •  cyclobenzaprine (FLEXERIL) 10 mg tablet, Take 1 tablet (10 mg total) by mouth daily at bedtime, Disp: 30 tablet, Rfl: 0  •  Garlic 1000 MG CAPS, Take by mouth, Disp: , Rfl:   •  methylPREDNISolone 4 MG tablet therapy pack, Use as directed on package, Disp: 21 each, Rfl: 0  •  multivitamins-fortified A-D-K (SOURCECF) solution, Take 0.5 mL by mouth daily., Disp: , Rfl:   •  omega-3-acid ethyl esters (LOVAZA) 1 g capsule, Take 2 g by mouth 2 (two) times a day, Disp: , Rfl:   •  VITAMIN D, ERGOCALCIFEROL, PO, Take by mouth, Disp: , Rfl:     Allergies   Allergen Reactions   • Nsaids Itching   • Oxycodone-Acetaminophen Itching   • Celecoxib    • Hydrocodone-Acetaminophen Itching   • Meloxicam    • Adhesive [Medical Tape] Rash       OBJECTIVE  Vitals:   Vitals:    04/24/24 0746   BP: 120/60   BP Location: Right arm   Patient Position: Sitting   Cuff Size: Standard   Pulse: 78   Resp: 16   Temp: 97.9 °F (36.6 °C)   SpO2: 99%   Weight: 70.8 kg (156 lb)   Height: 4' 11\" (1.499 m)         Physical Exam  Vitals reviewed.   Constitutional:       Appearance: She is well-developed.   HENT:      Head: Normocephalic and atraumatic.   Eyes:      Conjunctiva/sclera: Conjunctivae normal.      Pupils: Pupils are equal, round, and reactive to light.   Cardiovascular:      Rate and Rhythm: Normal rate and regular " rhythm.      Heart sounds: Normal heart sounds, S1 normal and S2 normal. No murmur heard.  Pulmonary:      Effort: Pulmonary effort is normal. No respiratory distress.      Breath sounds: Normal breath sounds. No wheezing.   Musculoskeletal:         General: Normal range of motion.      Cervical back: Normal range of motion and neck supple.      Comments: Considerable tight IT band on bilateral legs   Skin:     General: Skin is warm.   Neurological:      Mental Status: She is alert and oriented to person, place, and time.   Psychiatric:         Speech: Speech normal.         Behavior: Behavior normal.         Thought Content: Thought content normal.         Judgment: Judgment normal.                    Luly Valle MD,   [facility]  4/24/2024

## 2024-04-25 ENCOUNTER — TELEPHONE (OUTPATIENT)
Age: 69
End: 2024-04-25

## 2024-04-25 NOTE — TELEPHONE ENCOUNTER
Pt called in stating she need an insurance referral. Pt states she has not made initial appt yet.    Name: Dr. Jimmy Barber, [facility]  DX Code: M79.604, M54.16

## 2024-04-30 NOTE — TELEPHONE ENCOUNTER
Pt called inquiring about the insurance referral for Spine and Pain. She would like a return call when its is done so she can make the appt.     Please advise.

## 2024-05-16 NOTE — TELEPHONE ENCOUNTER
Patient called stating she still has not heard back from the office in regards to her insurance referral and if it was completed. Please call patient back to update.

## 2024-05-22 NOTE — TELEPHONE ENCOUNTER
Patient called in stating she needs an insurance referral placed for an upcoming appointment.    [facility] Barber  Date of appt 6/18 at 1:30PM  NPI# [id number]  Address is [address] [place] [address]  Phone number is [phone]  Fax number is [phone]  DX: M79.604 and M54.16  Patients phone number is [phone]    Please advise once insurance referral is completed. Thank you.

## 2024-06-18 ENCOUNTER — APPOINTMENT (OUTPATIENT)
Dept: RADIOLOGY | Facility: CLINIC | Age: 69
End: 2024-06-18
Payer: COMMERCIAL

## 2024-06-18 ENCOUNTER — TELEPHONE (OUTPATIENT)
Dept: PAIN MEDICINE | Facility: CLINIC | Age: 69
End: 2024-06-18

## 2024-06-18 ENCOUNTER — CONSULT (OUTPATIENT)
Dept: PAIN MEDICINE | Facility: CLINIC | Age: 69
End: 2024-06-18
Payer: COMMERCIAL

## 2024-06-18 VITALS
HEIGHT: 60 IN | SYSTOLIC BLOOD PRESSURE: 127 MMHG | HEART RATE: 79 BPM | DIASTOLIC BLOOD PRESSURE: 80 MMHG | BODY MASS INDEX: 31.02 KG/M2 | WEIGHT: 158 LBS

## 2024-06-18 DIAGNOSIS — M46.1 SACROILIITIS (HCC): Primary | ICD-10-CM

## 2024-06-18 DIAGNOSIS — M54.16 LUMBAR RADICULOPATHY: ICD-10-CM

## 2024-06-18 DIAGNOSIS — M54.59 LUMBAR FACET JOINT PAIN: ICD-10-CM

## 2024-06-18 DIAGNOSIS — M79.604 RIGHT LEG PAIN: ICD-10-CM

## 2024-06-18 DIAGNOSIS — M46.1 SACROILIITIS (HCC): ICD-10-CM

## 2024-06-18 PROCEDURE — 72110 X-RAY EXAM L-2 SPINE 4/>VWS: CPT

## 2024-06-18 PROCEDURE — 72220 X-RAY EXAM SACRUM TAILBONE: CPT

## 2024-06-18 PROCEDURE — 99204 OFFICE O/P NEW MOD 45 MIN: CPT | Performed by: PHYSICAL MEDICINE & REHABILITATION

## 2024-06-18 NOTE — H&P (VIEW-ONLY)
Assessment:  1. Right leg pain    2. Lumbar radiculopathy        Plan:    Ms. Alcala is a pleasant 68-year-old female significant past medical history of GERD, right hip and knee arthritis presents to [facility] for initial evaluation regarding ongoing bilateral buttock and low back pain.  During today's evaluation she is demonstrating pain that is likely multifactorial in nature with the majority of her symptoms appear to be generating from the bilateral SI joints as well as suspected facet mediated lumbar spine pain.  At this time updated imaging will be beneficial and warranted and we will order a new lumbar x-ray and sacral/coccyx x-ray.  Will also plan for bilateral SI joint injections for both diagnostic and therapeutic purposes.  All questions answered, patient is agreeable with plan.    Complete risks and benefits including bleeding, infection, tissue reaction, nerve injury and allergic reaction were discussed. The approach was demonstrated using models and literature was provided. Verbal and written consent was obtained.    History of Present Illness:    Heidi Alcala is a 68 y.o. female who presents to [facility] for initial evaluation of the above stated pain complaints. The patient has a past medical and chronic pain history as outlined in the assessment section. She was referred by Luly Rizo MD  [address] [place] [address]   Patient presents to [facility] for initial evaluation regarding several months duration of isolated right-sided hip and low back pain with intermittent rating symptoms into the right lower extremity.  Denies any significant inciting event or recent trauma.  Today reports moderate to severe pain rated 7-10 out of 10 and interfere with activities.  Pain is constant 100% of the time that is present throughout the day and night.  Describes symptoms of shooting, sharp, dull aching  pain.  Denies any significant lower extremity weakness or falls.  Does not use any durable medical recurrent fibrillation.  Symptoms are worse with lying down, standing, bending, sitting, walking, exercise.  Reports moderate relief with physical therapy, exercise, heat.  Denies any smoking or marijuana use.  Admits to social alcohol use.  Previously tried a multitude of medications including but not limited to Vicodin, codeine, Percocet, tramadol, Tylenol, Motrin, Flexeril with varying degrees of relief.  Presents today for initial evaluation.  Review of Systems:    Review of Systems   Constitutional:  Negative for chills and fatigue.   HENT:  Negative for ear pain, mouth sores and sinus pressure.    Eyes:  Negative for pain, redness and visual disturbance.   Respiratory:  Negative for shortness of breath and wheezing.    Cardiovascular:  Negative for chest pain and palpitations.   Gastrointestinal:  Negative for abdominal pain and nausea.   Endocrine: Negative for polyphagia.   Musculoskeletal:  Positive for back pain, gait problem and joint swelling. Negative for arthralgias and neck pain.        Decreased ROM, joint and muscle pain in the lower right back   Skin:  Negative for wound.   Neurological:  Positive for weakness and numbness. Negative for seizures.   Psychiatric/Behavioral:  Positive for sleep disturbance. Negative for dysphoric mood.            Past Medical History:   Diagnosis Date    Acid reflux     GERD (gastroesophageal reflux disease)        Past Surgical History:   Procedure Laterality Date    ABDOMINAL SURGERY      APPENDECTOMY LAPAROSCOPIC N/A 7/11/2022    Procedure: APPENDECTOMY LAPAROSCOPIC;  Surgeon: Daniel Mcmillan MD;  Location: [facility];  Service: General    BREAST SURGERY Left 1975    lumpectomy, benign    COLONOSCOPY N/A 01/12/2017    Procedure: COLONOSCOPY;  Surgeon: Kaz Daniel MD;  Location: [facility] GI LAB;  Service:     ESOPHAGOGASTRODUODENOSCOPY N/A 01/12/2017    Procedure:  ESOPHAGOGASTRODUODENOSCOPY (EGD);  Surgeon: Kaz Daniel MD;  Location: [facility] GI LAB;  Service:     FRACTURE SURGERY      nose    GASTRIC BYPASS  2011    HYSTERECTOMY      JOINT REPLACEMENT Left     knee    MD CORRJ HLX VLGS BNCTY SESMDC DSTL METAR OSTEOT Right 01/11/2016    Procedure: BUNIONECTOMY JUDI with Internal Fixation Right Foot;  Surgeon: Frank Centeno DPM;  Location: [facility];  Service: Podiatry    ROOSEVELT-EN-Y PROCEDURE      TONSILLECTOMY         Family History   Problem Relation Age of Onset    COPD Mother     COPD Father     COPD Brother     Diabetes Maternal Grandmother     Cancer Paternal Grandmother         ovarian    No Known Problems Sister     No Known Problems Maternal Aunt     No Known Problems Maternal Uncle     No Known Problems Paternal Aunt     No Known Problems Paternal Uncle     No Known Problems Maternal Grandfather     No Known Problems Paternal Grandfather     ADD / ADHD Neg Hx     Anesthesia problems Neg Hx     Clotting disorder Neg Hx     Collagen disease Neg Hx     Dislocations Neg Hx     Learning disabilities Neg Hx     Neurological problems Neg Hx     Osteoporosis Neg Hx     Rheumatologic disease Neg Hx     Scoliosis Neg Hx     Vascular Disease Neg Hx        Social History     Occupational History    Not on file   Tobacco Use    Smoking status: Never    Smokeless tobacco: Never   Vaping Use    Vaping status: Never Used   Substance and Sexual Activity    Alcohol use: Yes     Comment: socially    Drug use: No    Sexual activity: Not Currently         Current Outpatient Medications:     CALCIUM PO, Take by mouth, Disp: , Rfl:     cyanocobalamin (VITAMIN B-12) 500 mcg tablet, Take 500 mcg by mouth daily., Disp: , Rfl:     cyclobenzaprine (FLEXERIL) 10 mg tablet, Take 1 tablet (10 mg total) by mouth daily at bedtime, Disp: 30 tablet, Rfl: 0    Garlic 1000 MG CAPS, Take by mouth, Disp: , Rfl:     multivitamins-fortified A-D-K (SOURCECF) solution, Take 0.5 mL by mouth daily., Disp:  ", Rfl:     omega-3-acid ethyl esters (LOVAZA) 1 g capsule, Take 2 g by mouth 2 (two) times a day, Disp: , Rfl:     VITAMIN D, ERGOCALCIFEROL, PO, Take by mouth, Disp: , Rfl:     methylPREDNISolone 4 MG tablet therapy pack, Use as directed on package (Patient not taking: Reported on 6/18/2024), Disp: 21 each, Rfl: 0    Allergies   Allergen Reactions    Nsaids Itching    Oxycodone-Acetaminophen Itching    Celecoxib     Hydrocodone-Acetaminophen Itching    Meloxicam     Adhesive [Medical Tape] Rash       Physical Exam:    /80   Pulse 79   Ht 4' 11.5\" (1.511 m)   Wt 71.7 kg (158 lb)   BMI 31.38 kg/m²     Constitutional: normal, well developed, well nourished, alert, in no distress and non-toxic and no overt pain behavior.  Eyes: anicteric  HEENT: grossly intact  Neck: supple, symmetric, trachea midline and no masses   Pulmonary:even and unlabored  Cardiovascular:No edema or pitting edema present  Skin:Normal without rashes or lesions and well hydrated  Psychiatric:Mood and affect appropriate  Neurologic:Cranial Nerves II-XII grossly intact  Musculoskeletal:normal gait, tenderness to palpation bilateral lumbar paraspinals and distal to PSIS, forward flexed posture, MMT 5 out of 5 bilateral lower extremities, sensation grossly intact to light touch, DTRs within normal limits  POSITIVE Sacral compression testing bilaterally  POSITIVE Arian's test bilaterally  POSITIVE thigh thrust bilaterally    Imaging  No orders to display       No orders of the defined types were placed in this encounter.    "

## 2024-06-18 NOTE — TELEPHONE ENCOUNTER
Scheduled pt for SIJ 07/03/2024  Went over pre-procedure instructions below:  Nothing to eat or drink 1 hr prior to procedure  Need to arrange transportation  Proper clothing for procedure  No vaccines 2 weeks prior or after procedure  If ill or placed on antibiotics please call to reschedule

## 2024-06-18 NOTE — PROGRESS NOTES
Assessment:  1. Right leg pain    2. Lumbar radiculopathy        Plan:    Ms. Alcala is a pleasant 68-year-old female significant past medical history of GERD, right hip and knee arthritis presents to [facility] for initial evaluation regarding ongoing bilateral buttock and low back pain.  During today's evaluation she is demonstrating pain that is likely multifactorial in nature with the majority of her symptoms appear to be generating from the bilateral SI joints as well as suspected facet mediated lumbar spine pain.  At this time updated imaging will be beneficial and warranted and we will order a new lumbar x-ray and sacral/coccyx x-ray.  Will also plan for bilateral SI joint injections for both diagnostic and therapeutic purposes.  All questions answered, patient is agreeable with plan.    Complete risks and benefits including bleeding, infection, tissue reaction, nerve injury and allergic reaction were discussed. The approach was demonstrated using models and literature was provided. Verbal and written consent was obtained.    History of Present Illness:    Heidi Alcala is a 68 y.o. female who presents to [facility] for initial evaluation of the above stated pain complaints. The patient has a past medical and chronic pain history as outlined in the assessment section. She was referred by Luly Rizo MD  [address] [place] [address]   Patient presents to [facility] for initial evaluation regarding several months duration of isolated right-sided hip and low back pain with intermittent rating symptoms into the right lower extremity.  Denies any significant inciting event or recent trauma.  Today reports moderate to severe pain rated 7-10 out of 10 and interfere with activities.  Pain is constant 100% of the time that is present throughout the day and night.  Describes symptoms of shooting, sharp, dull aching  pain.  Denies any significant lower extremity weakness or falls.  Does not use any durable medical recurrent fibrillation.  Symptoms are worse with lying down, standing, bending, sitting, walking, exercise.  Reports moderate relief with physical therapy, exercise, heat.  Denies any smoking or marijuana use.  Admits to social alcohol use.  Previously tried a multitude of medications including but not limited to Vicodin, codeine, Percocet, tramadol, Tylenol, Motrin, Flexeril with varying degrees of relief.  Presents today for initial evaluation.  Review of Systems:    Review of Systems   Constitutional:  Negative for chills and fatigue.   HENT:  Negative for ear pain, mouth sores and sinus pressure.    Eyes:  Negative for pain, redness and visual disturbance.   Respiratory:  Negative for shortness of breath and wheezing.    Cardiovascular:  Negative for chest pain and palpitations.   Gastrointestinal:  Negative for abdominal pain and nausea.   Endocrine: Negative for polyphagia.   Musculoskeletal:  Positive for back pain, gait problem and joint swelling. Negative for arthralgias and neck pain.        Decreased ROM, joint and muscle pain in the lower right back   Skin:  Negative for wound.   Neurological:  Positive for weakness and numbness. Negative for seizures.   Psychiatric/Behavioral:  Positive for sleep disturbance. Negative for dysphoric mood.            Past Medical History:   Diagnosis Date    Acid reflux     GERD (gastroesophageal reflux disease)        Past Surgical History:   Procedure Laterality Date    ABDOMINAL SURGERY      APPENDECTOMY LAPAROSCOPIC N/A 7/11/2022    Procedure: APPENDECTOMY LAPAROSCOPIC;  Surgeon: Daniel Mcmillan MD;  Location: [facility];  Service: General    BREAST SURGERY Left 1975    lumpectomy, benign    COLONOSCOPY N/A 01/12/2017    Procedure: COLONOSCOPY;  Surgeon: Kaz Daniel MD;  Location: [facility] GI LAB;  Service:     ESOPHAGOGASTRODUODENOSCOPY N/A 01/12/2017    Procedure:  ESOPHAGOGASTRODUODENOSCOPY (EGD);  Surgeon: Kaz Daniel MD;  Location: [facility] GI LAB;  Service:     FRACTURE SURGERY      nose    GASTRIC BYPASS  2011    HYSTERECTOMY      JOINT REPLACEMENT Left     knee    KS CORRJ HLX VLGS BNCTY SESMDC DSTL METAR OSTEOT Right 01/11/2016    Procedure: BUNIONECTOMY JUDI with Internal Fixation Right Foot;  Surgeon: Frank Centeno DPM;  Location: [facility];  Service: Podiatry    ROOSEVELT-EN-Y PROCEDURE      TONSILLECTOMY         Family History   Problem Relation Age of Onset    COPD Mother     COPD Father     COPD Brother     Diabetes Maternal Grandmother     Cancer Paternal Grandmother         ovarian    No Known Problems Sister     No Known Problems Maternal Aunt     No Known Problems Maternal Uncle     No Known Problems Paternal Aunt     No Known Problems Paternal Uncle     No Known Problems Maternal Grandfather     No Known Problems Paternal Grandfather     ADD / ADHD Neg Hx     Anesthesia problems Neg Hx     Clotting disorder Neg Hx     Collagen disease Neg Hx     Dislocations Neg Hx     Learning disabilities Neg Hx     Neurological problems Neg Hx     Osteoporosis Neg Hx     Rheumatologic disease Neg Hx     Scoliosis Neg Hx     Vascular Disease Neg Hx        Social History     Occupational History    Not on file   Tobacco Use    Smoking status: Never    Smokeless tobacco: Never   Vaping Use    Vaping status: Never Used   Substance and Sexual Activity    Alcohol use: Yes     Comment: socially    Drug use: No    Sexual activity: Not Currently         Current Outpatient Medications:     CALCIUM PO, Take by mouth, Disp: , Rfl:     cyanocobalamin (VITAMIN B-12) 500 mcg tablet, Take 500 mcg by mouth daily., Disp: , Rfl:     cyclobenzaprine (FLEXERIL) 10 mg tablet, Take 1 tablet (10 mg total) by mouth daily at bedtime, Disp: 30 tablet, Rfl: 0    Garlic 1000 MG CAPS, Take by mouth, Disp: , Rfl:     multivitamins-fortified A-D-K (SOURCECF) solution, Take 0.5 mL by mouth daily., Disp:  ", Rfl:     omega-3-acid ethyl esters (LOVAZA) 1 g capsule, Take 2 g by mouth 2 (two) times a day, Disp: , Rfl:     VITAMIN D, ERGOCALCIFEROL, PO, Take by mouth, Disp: , Rfl:     methylPREDNISolone 4 MG tablet therapy pack, Use as directed on package (Patient not taking: Reported on 6/18/2024), Disp: 21 each, Rfl: 0    Allergies   Allergen Reactions    Nsaids Itching    Oxycodone-Acetaminophen Itching    Celecoxib     Hydrocodone-Acetaminophen Itching    Meloxicam     Adhesive [Medical Tape] Rash       Physical Exam:    /80   Pulse 79   Ht 4' 11.5\" (1.511 m)   Wt 71.7 kg (158 lb)   BMI 31.38 kg/m²     Constitutional: normal, well developed, well nourished, alert, in no distress and non-toxic and no overt pain behavior.  Eyes: anicteric  HEENT: grossly intact  Neck: supple, symmetric, trachea midline and no masses   Pulmonary:even and unlabored  Cardiovascular:No edema or pitting edema present  Skin:Normal without rashes or lesions and well hydrated  Psychiatric:Mood and affect appropriate  Neurologic:Cranial Nerves II-XII grossly intact  Musculoskeletal:normal gait, tenderness to palpation bilateral lumbar paraspinals and distal to PSIS, forward flexed posture, MMT 5 out of 5 bilateral lower extremities, sensation grossly intact to light touch, DTRs within normal limits  POSITIVE Sacral compression testing bilaterally  POSITIVE Arian's test bilaterally  POSITIVE thigh thrust bilaterally    Imaging  No orders to display       No orders of the defined types were placed in this encounter.    "

## 2024-06-28 NOTE — TELEPHONE ENCOUNTER
Caller: walter    Doctor: kevin    Reason for call: pt states her procedure is being denied by insurance. She wants to know what to do?    Call back#: [phone]

## 2024-07-03 ENCOUNTER — HOSPITAL ENCOUNTER (OUTPATIENT)
Facility: AMBULARY SURGERY CENTER | Age: 69
Setting detail: OUTPATIENT SURGERY
Discharge: HOME | End: 2024-07-03
Attending: PHYSICAL MEDICINE & REHABILITATION | Admitting: PHYSICAL MEDICINE & REHABILITATION
Payer: COMMERCIAL

## 2024-07-03 ENCOUNTER — APPOINTMENT (OUTPATIENT)
Dept: RADIOLOGY | Facility: HOSPITAL | Age: 69
End: 2024-07-03
Payer: COMMERCIAL

## 2024-07-03 VITALS
WEIGHT: 158 LBS | DIASTOLIC BLOOD PRESSURE: 70 MMHG | SYSTOLIC BLOOD PRESSURE: 154 MMHG | HEIGHT: 60 IN | RESPIRATION RATE: 18 BRPM | BODY MASS INDEX: 31.02 KG/M2 | HEART RATE: 76 BPM | TEMPERATURE: 98 F | OXYGEN SATURATION: 94 %

## 2024-07-03 PROBLEM — M46.1 SACROILIITIS (HCC): Status: ACTIVE | Noted: 2024-07-03

## 2024-07-03 PROCEDURE — NC001 PR NO CHARGE: Performed by: PHYSICAL MEDICINE & REHABILITATION

## 2024-07-03 PROCEDURE — 27096 INJECT SACROILIAC JOINT: CPT | Performed by: PHYSICAL MEDICINE & REHABILITATION

## 2024-07-03 RX ORDER — ROPIVACAINE HYDROCHLORIDE 2 MG/ML
INJECTION, SOLUTION EPIDURAL; INFILTRATION; PERINEURAL AS NEEDED
Status: DISCONTINUED | OUTPATIENT
Start: 2024-07-03 | End: 2024-07-03 | Stop reason: HOSPADM

## 2024-07-03 RX ORDER — LIDOCAINE HYDROCHLORIDE 10 MG/ML
INJECTION, SOLUTION EPIDURAL; INFILTRATION; INTRACAUDAL; PERINEURAL AS NEEDED
Status: DISCONTINUED | OUTPATIENT
Start: 2024-07-03 | End: 2024-07-03 | Stop reason: HOSPADM

## 2024-07-03 RX ORDER — METHYLPREDNISOLONE ACETATE 80 MG/ML
INJECTION, SUSPENSION INTRA-ARTICULAR; INTRALESIONAL; INTRAMUSCULAR; SOFT TISSUE AS NEEDED
Status: DISCONTINUED | OUTPATIENT
Start: 2024-07-03 | End: 2024-07-03 | Stop reason: HOSPADM

## 2024-07-03 NOTE — OP NOTE
OPERATIVE REPORT  PATIENT NAME: Heidi Alcala    :  1955  MRN: [id number]  Pt Location: [facility] MINOR/PAIN ROOM 01    SURGERY DATE: 7/3/2024    Surgeons and Role:     * Jimmy Barber DO - Primary    Preop Diagnosis:  Sacroiliitis, not elsewhere classified (HCC) [M46.1]    Post-Op Diagnosis Codes:     * Sacroiliitis, not elsewhere classified (HCC) [M46.1]    Procedure(s):  Bilateral - BILATERAL SACROILIAC JOINT INJECTION  Indication:  Low back/buttock pain  Preoperative Diagnosis:                     1. Sacroiliac Joint Pain  2. Sacroiliitis  Postoperative Diagnosis:                     1. Sacroiliac Joint Pain  2. Sacroiliitis     Procedure:                      Fluoroscopically-guided injection of the bilateral sacroiliac joints     EBL:  none  Specimens:  not applicable     After discussing the risks, benefits, and alternatives to the procedure, the patient expressed understanding and wished to proceed.  The patient was brought to the fluoroscopy suite and placed in the prone position.  Procedural pause conducted to verify:  correct patient identity, procedure to be performed and as applicable, correct side and site, correct patient position, and availability of implants, special equipment and special requirements.  Using fluoroscopy, the inferior portion of the sacroiliac joint was identified. The skin was sterilely prepped and draped in the usual fashion using Chloraprep skin prep. The skin and subcutaneous tissue were anesthetized with 1% lidocaine.   Using fluoroscopic guidance, a 3.5 inch 22-gauge spinal needle was advanced into joint.  Proper needle positioning was confirmed using multiple fluoroscopic views.  After negative aspiration, Omnipaque 240 contrast was injected, showing intraarticular spread of contrast without any evidence of intravascular uptake.  A 3 mL solution consisting of 40 mg of Depo-Medrol in 0.2% ropivacaine was injected slowly and incrementally into the joint.  Following  the injection, the needle was withdrawn.  The procedure was repeated in the exact same way on the opposite side.  The patient tolerated the procedure well and there were no apparent complications.  After appropriate observation, the patient was dismissed from the clinic in good condition under their own power.           SIGNATURE: Jimmy Barber, DO  DATE: July 3, 2024  TIME: 1:55 PM

## 2024-07-03 NOTE — DISCHARGE INSTRUCTIONS

## 2024-07-17 ENCOUNTER — TELEPHONE (OUTPATIENT)
Dept: PAIN MEDICINE | Facility: CLINIC | Age: 69
End: 2024-07-17

## 2024-07-17 ENCOUNTER — OFFICE VISIT (OUTPATIENT)
Dept: FAMILY MEDICINE CLINIC | Facility: CLINIC | Age: 69
End: 2024-07-17
Payer: COMMERCIAL

## 2024-07-17 VITALS
TEMPERATURE: 96.2 F | DIASTOLIC BLOOD PRESSURE: 64 MMHG | RESPIRATION RATE: 18 BRPM | SYSTOLIC BLOOD PRESSURE: 106 MMHG | HEIGHT: 58 IN | HEART RATE: 77 BPM | BODY MASS INDEX: 32.24 KG/M2 | WEIGHT: 153.6 LBS

## 2024-07-17 DIAGNOSIS — Z11.59 NEED FOR HEPATITIS C SCREENING TEST: ICD-10-CM

## 2024-07-17 DIAGNOSIS — G25.0 ESSENTIAL TREMOR: ICD-10-CM

## 2024-07-17 DIAGNOSIS — Z13.29 SCREENING FOR THYROID DISORDER: ICD-10-CM

## 2024-07-17 DIAGNOSIS — Z00.00 PHYSICAL EXAM: Primary | ICD-10-CM

## 2024-07-17 DIAGNOSIS — M54.50 LUMBAR SPINE PAIN: ICD-10-CM

## 2024-07-17 DIAGNOSIS — R53.83 OTHER FATIGUE: ICD-10-CM

## 2024-07-17 DIAGNOSIS — E78.5 HYPERLIPIDEMIA, UNSPECIFIED HYPERLIPIDEMIA TYPE: ICD-10-CM
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
## 2024-07-17 PROCEDURE — 99397 PER PM REEVAL EST PAT 65+ YR: CPT | Performed by: FAMILY MEDICINE

## 2024-07-17 NOTE — PROGRESS NOTES
Adult Annual Physical  Name: Heidi Alcala      : 1955      MRN: [id number]  Encounter Provider: Luly Valle MD  Encounter Date: 2024   Encounter department: [facility]    Assessment & Plan   1. Physical exam  -     CBC and differential; Future  -     Comprehensive metabolic panel; Future  -     CBC and differential  -     Comprehensive metabolic panel  2. Hyperlipidemia, unspecified hyperlipidemia type  -     Lipid panel; Future  -     Lipid panel  3. Screening for thyroid disorder  -     TSH, 3rd generation with Free T4 reflex; Future  -     TSH, 3rd generation with Free T4 reflex  4. Essential tremor  5. Lumbar spine pain  6. Tick bite, unspecified site, sequela  -     Lyme Total AB W Reflex to IGM/IGG; Future  -     Lyme Total AB W Reflex to IGM/IGG  7. Need for hepatitis C screening test  -     Hepatitis C Antibody; Future  -     Hepatitis C Antibody  8. Other fatigue  -     Iron; Future  -     Ferritin; Future  -     Iron  -     Ferritin    Did discuss with the patient that once you are positive for Lyme's disease it is very rare for having a negative test.  But will check to see if the patient has an IgM positive  Lumbar spine currently improving continue management per pain management  History of elevated ferritin with fatigue recommend an iron panel for evaluation.  History of hyperlipidemia repeat lipid panel.  Life screening did show mild carotids on the left           History of Present Illness     Adult Annual Physical:  Patient presents for annual physical. 69 y/o F presents physical exam  Carotid artery disease: Left has mild plaque versus right is normal  EKG was within normal limits  Abdominal aortic aneurysm normal  Peripheral artery disease normal  Patient does have a history of a positive Lyme's disease several years ago.  But patient states that she has been having bodyaches and has had recent tick bites  Just recently had a steroid injection to her  "back.  Patient states she is doing extremely well from that standpoint states that the numbness has improved.  Was sent for a bone density we will try to figure out where she had it done in the past  Does have a history of elevated ferritin and tried to go for phlebotomy but was turned away.  Patient would like these levels checked again if possible.     Diet and Physical Activity:  - Diet/Nutrition: poor diet.  - Exercise: 5-7 times a week on average. 2 hours  aday 4 times week    Depression Screening:  - PHQ-2 Score: 0    General Health:  - Sleep:. 10pm wake 4 ( cat problems)  - Hearing: normal hearing right ear and normal hearing left ear.  - Vision: wears glasses.  - Dental: regular dental visits.      Review of Systems   Constitutional:  Negative for activity change, appetite change, chills, fatigue and fever.   HENT:  Negative for congestion.    Respiratory:  Negative for cough, chest tightness and shortness of breath.    Cardiovascular:  Negative for chest pain and leg swelling.   Gastrointestinal:  Negative for abdominal distention, abdominal pain, constipation, diarrhea, nausea and vomiting.   Musculoskeletal:  Positive for back pain.   All other systems reviewed and are negative.        Objective     /64   Pulse 77   Temp (!) 96.2 °F (35.7 °C)   Resp 18   Ht 4' 10\" (1.473 m)   Wt 69.7 kg (153 lb 9.6 oz)   BMI 32.10 kg/m²     Physical Exam  Vitals reviewed.   Constitutional:       Appearance: Normal appearance. She is well-developed.   HENT:      Head: Normocephalic and atraumatic.      Right Ear: Tympanic membrane, ear canal and external ear normal. There is no impacted cerumen.      Left Ear: Tympanic membrane, ear canal and external ear normal. There is no impacted cerumen.      Nose: Nose normal.      Mouth/Throat:      Mouth: Mucous membranes are moist.      Pharynx: Oropharynx is clear.   Eyes:      Conjunctiva/sclera: Conjunctivae normal.      Pupils: Pupils are equal, round, and reactive " to light.   Cardiovascular:      Rate and Rhythm: Normal rate and regular rhythm.      Heart sounds: Normal heart sounds.   Pulmonary:      Effort: Pulmonary effort is normal.      Breath sounds: Normal breath sounds.   Abdominal:      General: Abdomen is flat. Bowel sounds are normal.      Palpations: Abdomen is soft.   Musculoskeletal:         General: Normal range of motion.      Cervical back: Normal range of motion and neck supple.   Skin:     General: Skin is warm.      Capillary Refill: Capillary refill takes less than 2 seconds.   Neurological:      General: No focal deficit present.      Mental Status: She is alert and oriented to person, place, and time. Mental status is at baseline.   Psychiatric:         Mood and Affect: Mood normal.         Behavior: Behavior normal.         Thought Content: Thought content normal.         Judgment: Judgment normal.

## 2024-07-18 ENCOUNTER — VBI (OUTPATIENT)
Dept: ADMINISTRATIVE | Facility: OTHER | Age: 69
End: 2024-07-18

## 2024-07-19 NOTE — TELEPHONE ENCOUNTER
Upon review of the In Basket request we were able to locate, review, and update the patient chart as requested for DEXA Scan.    Any additional questions or concerns should be emailed to the Practice Liaisons via the appropriate education email address, please do not reply via In Basket.    Thank you  Bárbara Norton [facility]

## 2024-07-23 LAB
ALBUMIN SERPL-MCNC: 4.4 G/DL (ref 3.9–4.9)
ALP SERPL-CCNC: 112 IU/L (ref 44–121)
ALT SERPL-CCNC: 21 IU/L (ref 0–32)
AST SERPL-CCNC: 29 IU/L (ref 0–40)
BASOPHILS # BLD AUTO: 0 X10E3/UL (ref 0–0.2)
BASOPHILS NFR BLD AUTO: 1 %
BILIRUB SERPL-MCNC: 1.3 MG/DL (ref 0–1.2)
BUN SERPL-MCNC: 12 MG/DL (ref 8–27)
BUN/CREAT SERPL: 16 (ref 12–28)
CALCIUM SERPL-MCNC: 9.6 MG/DL (ref 8.7–10.3)
CHLORIDE SERPL-SCNC: 103 MMOL/L (ref 96–106)
CHOLEST SERPL-MCNC: 243 MG/DL (ref 100–199)
CHOLEST/HDLC SERPL: 1.9 RATIO (ref 0–4.4)
CO2 SERPL-SCNC: 22 MMOL/L (ref 20–29)
CREAT SERPL-MCNC: 0.74 MG/DL (ref 0.57–1)
EGFR: 89 ML/MIN/1.73
EOSINOPHIL # BLD AUTO: 0.1 X10E3/UL (ref 0–0.4)
EOSINOPHIL NFR BLD AUTO: 2 %
ERYTHROCYTE [DISTWIDTH] IN BLOOD BY AUTOMATED COUNT: 12.3 % (ref 11.7–15.4)
FERRITIN SERPL-MCNC: 61 NG/ML (ref 15–150)
GLOBULIN SER-MCNC: 2.3 G/DL (ref 1.5–4.5)
GLUCOSE SERPL-MCNC: 94 MG/DL (ref 70–99)
HCT VFR BLD AUTO: 40.7 % (ref 34–46.6)
HCV AB S/CO SERPL IA: NON REACTIVE
HDLC SERPL-MCNC: 126 MG/DL
HGB BLD-MCNC: 13.5 G/DL (ref 11.1–15.9)
IMM GRANULOCYTES # BLD: 0 X10E3/UL (ref 0–0.1)
IMM GRANULOCYTES NFR BLD: 0 %
IRON SERPL-MCNC: 152 UG/DL (ref 27–139)
LDLC SERPL CALC-MCNC: 104 MG/DL (ref 0–99)
LYMPHOCYTES # BLD AUTO: 1.5 X10E3/UL (ref 0.7–3.1)
LYMPHOCYTES NFR BLD AUTO: 26 %
MCH RBC QN AUTO: 32.9 PG (ref 26.6–33)
MCHC RBC AUTO-ENTMCNC: 33.2 G/DL (ref 31.5–35.7)
MCV RBC AUTO: 99 FL (ref 79–97)
MONOCYTES # BLD AUTO: 0.5 X10E3/UL (ref 0.1–0.9)
MONOCYTES NFR BLD AUTO: 9 %
NEUTROPHILS # BLD AUTO: 3.4 X10E3/UL (ref 1.4–7)
NEUTROPHILS NFR BLD AUTO: 62 %
PLATELET # BLD AUTO: 197 X10E3/UL (ref 150–450)
POTASSIUM SERPL-SCNC: 4.2 MMOL/L (ref 3.5–5.2)
PROT SERPL-MCNC: 6.7 G/DL (ref 6–8.5)
RBC # BLD AUTO: 4.1 X10E6/UL (ref 3.77–5.28)
SL AMB VLDL CHOLESTEROL CALC: 13 MG/DL (ref 5–40)
SODIUM SERPL-SCNC: 139 MMOL/L (ref 134–144)
TRIGL SERPL-MCNC: 78 MG/DL (ref 0–149)
TSH SERPL DL<=0.005 MIU/L-ACNC: 2.98 UIU/ML (ref 0.45–4.5)
WBC # BLD AUTO: 5.6 X10E3/UL (ref 3.4–10.8)

## 2024-08-06 ENCOUNTER — TELEPHONE (OUTPATIENT)
Age: 69
End: 2024-08-06

## 2024-08-06 NOTE — TELEPHONE ENCOUNTER
Heidi is returning call for her lab results. Relayed providers message and asked questions to patient as per provider message. Patient expressed understanding and had the following response: She is not currently taking any iron supplements unless there is iron in the vitamins she takes. She takes D3, garlic, B12, milk Thistle, Calcium and fish oil.     She is also not sure why the Lyme was not drawn. Does she need to go back?  Please advise, thank you!

## 2024-08-07 DIAGNOSIS — R53.83 OTHER FATIGUE: ICD-10-CM

## 2024-08-07 DIAGNOSIS — G25.0 ESSENTIAL TREMOR: ICD-10-CM

## 2024-08-07 DIAGNOSIS — E83.19 IRON EXCESS: ICD-10-CM
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
## 2024-08-07 NOTE — TELEPHONE ENCOUNTER
Spoke to the patient she states that she does take  multivitamins that do include iron.  And will stop that.  She also states that she does eat a lot of eggs and will try to reduce that as well.  I did recommend her go donate blood.  If they deny her like they done in the past we will send her for a phlebotomy at the hospital.  Lyme's testing was not taken therefore recommend getting a redraw  Patient agrees with plan      Repeat iron in 1 to 2 months

## 2024-10-15 DIAGNOSIS — Z12.31 ENCOUNTER FOR SCREENING MAMMOGRAM FOR BREAST CANCER: Primary | ICD-10-CM

## 2024-10-23 ENCOUNTER — OFFICE VISIT (OUTPATIENT)
Dept: FAMILY MEDICINE CLINIC | Facility: CLINIC | Age: 69
End: 2024-10-23
Payer: COMMERCIAL

## 2024-10-23 VITALS
BODY MASS INDEX: 32.41 KG/M2 | RESPIRATION RATE: 16 BRPM | OXYGEN SATURATION: 99 % | SYSTOLIC BLOOD PRESSURE: 100 MMHG | TEMPERATURE: 97.3 F | HEART RATE: 90 BPM | DIASTOLIC BLOOD PRESSURE: 62 MMHG | WEIGHT: 154.4 LBS | HEIGHT: 58 IN

## 2024-10-23 DIAGNOSIS — M25.50 PAIN IN JOINT, MULTIPLE SITES: ICD-10-CM

## 2024-10-23 DIAGNOSIS — M19.90 ARTHRITIS: Primary | ICD-10-CM

## 2024-10-23 PROCEDURE — 99214 OFFICE O/P EST MOD 30 MIN: CPT | Performed by: FAMILY MEDICINE

## 2024-10-23 RX ORDER — METHYLPREDNISOLONE 4 MG/1
TABLET ORAL
Qty: 21 EACH | Refills: 0 | Status: SHIPPED | OUTPATIENT
Start: 2024-10-23

## 2024-10-23 NOTE — PROGRESS NOTES
Heidi Alcala 1955 female MRN: [id number]    [facility] OFFICE VISIT  [facility] - [facility]      ASSESSMENT/PLAN  Heidi Alcala is a 69 y.o. female presents to the office for    Diagnoses and all orders for this visit:    Arthritis  -     Lyme Total AB W Reflex to IGM/IGG; Future  -     Sedimentation rate, automated; Future  -     MICHAEL w/Reflex if Positive; Future  -     C-reactive protein; Future  -     Lyme Total AB W Reflex to IGM/IGG  -     Sedimentation rate, automated  -     MICHAEL w/Reflex if Positive  -     C-reactive protein  -     methylPREDNISolone 4 MG tablet therapy pack; Use as directed on package  -     Sedimentation rate, automated; Future  -     Sedimentation rate, automated  -     CBC and Platelet; Future  -     Comprehensive Metabolic Panel; Future  -     RF Screen w/ Reflex to Titer; Future  -     Cyclic citrul peptide antibody, IgG; Future  -     Sjogren's Antibodies; Future  -     Uric acid; Future  -     Lyme Total AB W Reflex to IGM/IGG; Future  -     C-reactive protein; Future  -     Sedimentation rate, automated; Future  -     MICHAEL Screen w/ Reflex to Titer/Pattern; Future  -     CBC and Platelet  -     Comprehensive Metabolic Panel  -     Sjogren's Antibodies  -     Uric acid  -     Lyme Total AB W Reflex to IGM/IGG  -     C-reactive protein  -     Sedimentation rate, automated  -     Ambulatory Referral to Rheumatology; Future    Pain in joint, multiple sites  -     Lyme Total AB W Reflex to IGM/IGG; Future  -     Sedimentation rate, automated; Future  -     MICHAEL w/Reflex if Positive; Future  -     C-reactive protein; Future  -     Lyme Total AB W Reflex to IGM/IGG  -     Sedimentation rate, automated  -     MICHAEL w/Reflex if Positive  -     C-reactive protein  -     methylPREDNISolone 4 MG tablet therapy pack; Use as directed on package  -     Sedimentation rate, automated; Future  -     Sedimentation rate, automated  -     CBC and  Platelet; Future  -     Comprehensive Metabolic Panel; Future  -     RF Screen w/ Reflex to Titer; Future  -     Cyclic citrul peptide antibody, IgG; Future  -     Sjogren's Antibodies; Future  -     Uric acid; Future  -     Lyme Total AB W Reflex to IGM/IGG; Future  -     C-reactive protein; Future  -     Sedimentation rate, automated; Future  -     MICHAEL Screen w/ Reflex to Titer/Pattern; Future  -     CBC and Platelet  -     Comprehensive Metabolic Panel  -     Sjogren's Antibodies  -     Uric acid  -     Lyme Total AB W Reflex to IGM/IGG  -     C-reactive protein  -     Sedimentation rate, automated  -     Ambulatory Referral to Rheumatology; Future     I know that the patient has osteoarthritis however I am concerned given that it is jumping from joint to joint and want to do a full rheumatological workup.  I did place orders for her and would like her to see the rheumatologist if possible.  Prednisone will help her but I want to be sure that it is discovered what is causing the symptoms.  I do recommend continuing taking the turmeric and vitamin supplements           Future Appointments   Date Time Provider Department Center   1/15/2025  8:00 AM [facility]   2/6/2025  8:30 AM [address]          SUBJECTIVE  CC: Joint Pain (Patient said every day it is a different body part . Patient had lyme years ago)      HPI:  Heidi Alcala is a 69 y.o. female who presents for an acute appointment.  Patient has had several years of having joint pains in the fingers shoulders knees hips.  Just recently had her thumb swollen and she could not use it.  And then woke up the next day with shoulder pain.  Seems like her pain is jumping from joint to joint.  No autoimmune family history  Review of Systems   Constitutional:  Negative for activity change, appetite change, chills, fatigue and fever.   HENT:  Negative for congestion.    Respiratory:  Negative for cough,  "chest tightness and shortness of breath.    Cardiovascular:  Negative for chest pain and leg swelling.   Gastrointestinal:  Negative for abdominal distention, abdominal pain, constipation, diarrhea, nausea and vomiting.   Musculoskeletal:  Positive for arthralgias.   All other systems reviewed and are negative.      Historical Information   The patient history was reviewed as follows:  Past Medical History:   Diagnosis Date    Acid reflux     GERD (gastroesophageal reflux disease)          Medications:     Current Outpatient Medications:     CALCIUM PO, Take by mouth, Disp: , Rfl:     cyanocobalamin (VITAMIN B-12) 500 mcg tablet, Take 500 mcg by mouth daily., Disp: , Rfl:     Garlic 1000 MG CAPS, Take by mouth, Disp: , Rfl:     methylPREDNISolone 4 MG tablet therapy pack, Use as directed on package, Disp: 21 each, Rfl: 0    multivitamins-fortified A-D-K (SOURCECF) solution, Take 0.5 mL by mouth daily., Disp: , Rfl:     omega-3-acid ethyl esters (LOVAZA) 1 g capsule, Take 2 g by mouth 2 (two) times a day, Disp: , Rfl:     VITAMIN D, ERGOCALCIFEROL, PO, Take by mouth, Disp: , Rfl:     Allergies   Allergen Reactions    Nsaids Itching    Oxycodone-Acetaminophen Itching    Celecoxib     Hydrocodone-Acetaminophen Itching    Meloxicam     Adhesive [Medical Tape] Rash       OBJECTIVE  Vitals:   Vitals:    10/23/24 1005   BP: 100/62   BP Location: Right arm   Patient Position: Sitting   Cuff Size: Standard   Pulse: 90   Resp: 16   Temp: (!) 97.3 °F (36.3 °C)   SpO2: 99%   Weight: 70 kg (154 lb 6.4 oz)   Height: 4' 10\" (1.473 m)         Physical Exam  Constitutional:       General: She is not in acute distress.     Appearance: She is well-developed. She is not ill-appearing.   Pulmonary:      Effort: Pulmonary effort is normal.   Musculoskeletal:         General: Normal range of motion.      Comments: Patient has crepitus bilateral on her shoulders.  Does not have any swelling in her fingers.   Skin:     Capillary Refill: " Capillary refill takes less than 2 seconds.   Neurological:      Mental Status: She is alert and oriented to person, place, and time.   Psychiatric:         Behavior: Behavior normal.         Thought Content: Thought content normal.         Judgment: Judgment normal.                    Luly Rizo MD,   [facility]  10/23/2024

## 2024-10-25 LAB
ALBUMIN SERPL-MCNC: 4.3 G/DL (ref 3.9–4.9)
ALP SERPL-CCNC: 97 IU/L (ref 44–121)
ALT SERPL-CCNC: 15 IU/L (ref 0–32)
ANA SER QL: NEGATIVE
AST SERPL-CCNC: 26 IU/L (ref 0–40)
B BURGDOR IGG+IGM SER QL IA: NEGATIVE
BILIRUB SERPL-MCNC: 1 MG/DL (ref 0–1.2)
BUN SERPL-MCNC: 13 MG/DL (ref 8–27)
BUN/CREAT SERPL: 16 (ref 12–28)
CALCIUM SERPL-MCNC: 9.3 MG/DL (ref 8.7–10.3)
CHLORIDE SERPL-SCNC: 105 MMOL/L (ref 96–106)
CO2 SERPL-SCNC: 25 MMOL/L (ref 20–29)
CREAT SERPL-MCNC: 0.83 MG/DL (ref 0.57–1)
CRP SERPL-MCNC: 2 MG/L (ref 0–10)
CRP SERPL-MCNC: 2 MG/L (ref 0–10)
EGFR: 77 ML/MIN/1.73
ENA SS-A AB SER-ACNC: <0.2 AI (ref 0–0.9)
ENA SS-B AB SER-ACNC: <0.2 AI (ref 0–0.9)
ERYTHROCYTE [DISTWIDTH] IN BLOOD BY AUTOMATED COUNT: 11.1 % (ref 11.7–15.4)
ERYTHROCYTE [SEDIMENTATION RATE] IN BLOOD BY WESTERGREN METHOD: 14 MM/HR (ref 0–40)
ERYTHROCYTE [SEDIMENTATION RATE] IN BLOOD BY WESTERGREN METHOD: 4 MM/HR (ref 0–40)
FERRITIN SERPL-MCNC: 64 NG/ML (ref 15–150)
GLOBULIN SER-MCNC: 2.2 G/DL (ref 1.5–4.5)
GLUCOSE SERPL-MCNC: 87 MG/DL (ref 70–99)
HCT VFR BLD AUTO: 38.8 % (ref 34–46.6)
HGB BLD-MCNC: 12.9 G/DL (ref 11.1–15.9)
IRON SERPL-MCNC: 141 UG/DL (ref 27–139)
MCH RBC QN AUTO: 32.4 PG (ref 26.6–33)
MCHC RBC AUTO-ENTMCNC: 33.2 G/DL (ref 31.5–35.7)
MCV RBC AUTO: 98 FL (ref 79–97)
PLATELET # BLD AUTO: 221 X10E3/UL (ref 150–450)
POTASSIUM SERPL-SCNC: 4.5 MMOL/L (ref 3.5–5.2)
PROT SERPL-MCNC: 6.5 G/DL (ref 6–8.5)
RBC # BLD AUTO: 3.98 X10E6/UL (ref 3.77–5.28)
SODIUM SERPL-SCNC: 143 MMOL/L (ref 134–144)
URATE SERPL-MCNC: 6.3 MG/DL (ref 3–7.2)
WBC # BLD AUTO: 4.2 X10E3/UL (ref 3.4–10.8)

## 2024-11-27 ENCOUNTER — TELEPHONE (OUTPATIENT)
Age: 69
End: 2024-11-27

## 2024-11-27 NOTE — TELEPHONE ENCOUNTER
Patient called tweaked her right knee 2 weeks ago and is having pain. Would like a recommendation. Is scheduled for appointment on 12/13/2024.

## 2025-01-15 ENCOUNTER — OFFICE VISIT (OUTPATIENT)
Dept: FAMILY MEDICINE CLINIC | Facility: CLINIC | Age: 70
End: 2025-01-15
Payer: COMMERCIAL

## 2025-01-15 VITALS
TEMPERATURE: 96.4 F | HEIGHT: 58 IN | WEIGHT: 155.4 LBS | SYSTOLIC BLOOD PRESSURE: 102 MMHG | RESPIRATION RATE: 16 BRPM | BODY MASS INDEX: 32.62 KG/M2 | HEART RATE: 86 BPM | OXYGEN SATURATION: 98 % | DIASTOLIC BLOOD PRESSURE: 64 MMHG

## 2025-01-15 DIAGNOSIS — E83.19 IRON EXCESS: ICD-10-CM

## 2025-01-15 DIAGNOSIS — M46.1 SACROILIITIS (HCC): ICD-10-CM

## 2025-01-15 DIAGNOSIS — M25.50 PAIN IN JOINT, MULTIPLE SITES: ICD-10-CM

## 2025-01-15 DIAGNOSIS — E78.5 HYPERLIPIDEMIA, UNSPECIFIED HYPERLIPIDEMIA TYPE: ICD-10-CM

## 2025-01-15 DIAGNOSIS — Z13.29 SCREENING FOR THYROID DISORDER: ICD-10-CM

## 2025-01-15 DIAGNOSIS — M54.16 LUMBAR RADICULOPATHY: Primary | ICD-10-CM

## 2025-01-15 PROCEDURE — 99214 OFFICE O/P EST MOD 30 MIN: CPT | Performed by: FAMILY MEDICINE

## 2025-01-15 NOTE — PROGRESS NOTES
Heidi Alcala 1955 female MRN: [id number]    [facility] OFFICE VISIT  [facility] - [facility]      ASSESSMENT/PLAN  Heidi Alcala is a 69 y.o. female presents to the office for    Diagnoses and all orders for this visit:    Lumbar radiculopathy    Screening for thyroid disorder  -     TSH, 3rd generation with Free T4 reflex; Future  -     TSH, 3rd generation with Free T4 reflex    Hyperlipidemia, unspecified hyperlipidemia type  -     Comprehensive metabolic panel; Future  -     Lipid panel; Future  -     Comprehensive metabolic panel  -     Lipid panel    Pain in joint, multiple sites  -     Cyclic citrul peptide antibody, IgG; Future  -     RF Screen w/ Reflex to Titer; Future    Iron excess  -     CBC and differential; Future  -     CBC and differential  -     Iron; Future  -     Iron  -     Ferritin; Future  -     Ferritin    Sacroiliitis (HCC)    Unfortunately patient has history of chronic lumbar pain.  She did recently fall states that her pain has improved.  I do recommend that if the pain does come back to possibly try using the chiropractor to see if maybe she needs an alignment given that the pain improved upon falling this pain has been chronic for several years prior to the fall.  History of hemochromatosis pending blood work.  Hyperlipidemia continue closely monitoring           Future Appointments   Date Time Provider Department Center   2/6/2025  8:30 AM [address]   7/23/2025  8:00 AM [facility]          SUBJECTIVE  CC: Follow-up (6 month follow up )      HPI:  Heidi Alcala is a 69 y.o. female who presents for an chronic follow up.  1/5 Fell states that when she tripped and fell she felt like her back significantly improved after 2 days of recovering.  She states her chronic pains for some reason has improved.  Patient states that she has always suffered from osteoarthritis.  Has not  seen a chiropractor recently.  But is not opposed to it.  Does have a history of needing phlebotomy.  Unable to donate blood given her high iron count.      Review of Systems   Constitutional:  Negative for activity change, appetite change, chills, fatigue and fever.   HENT:  Negative for congestion.    Respiratory:  Negative for cough, chest tightness and shortness of breath.    Cardiovascular:  Negative for chest pain and leg swelling.   Gastrointestinal:  Negative for abdominal distention, abdominal pain, constipation, diarrhea, nausea and vomiting.   Musculoskeletal:  Positive for arthralgias and back pain.   All other systems reviewed and are negative.      Historical Information   The patient history was reviewed as follows:  Past Medical History:   Diagnosis Date   • Acid reflux    • GERD (gastroesophageal reflux disease)          Medications:     Current Outpatient Medications:   •  CALCIUM PO, Take by mouth, Disp: , Rfl:   •  cyanocobalamin (VITAMIN B-12) 500 mcg tablet, Take 500 mcg by mouth daily., Disp: , Rfl:   •  Garlic 1000 MG CAPS, Take by mouth, Disp: , Rfl:   •  multivitamins-fortified A-D-K (SOURCECF) solution, Take 0.5 mL by mouth daily., Disp: , Rfl:   •  omega-3-acid ethyl esters (LOVAZA) 1 g capsule, Take 2 g by mouth 2 (two) times a day, Disp: , Rfl:   •  VITAMIN D, ERGOCALCIFEROL, PO, Take by mouth, Disp: , Rfl:   •  methylPREDNISolone 4 MG tablet therapy pack, Use as directed on package (Patient not taking: Reported on 1/15/2025), Disp: 21 each, Rfl: 0    Allergies   Allergen Reactions   • Nsaids Itching   • Oxycodone-Acetaminophen Itching   • Celecoxib    • Hydrocodone-Acetaminophen Itching   • Meloxicam    • Adhesive [Medical Tape] Rash       OBJECTIVE  Vitals:   Vitals:    01/15/25 0756   BP: 102/64   BP Location: Right arm   Patient Position: Sitting   Cuff Size: Standard   Pulse: 86   Resp: 16   Temp: (!) 96.4 °F (35.8 °C)   TempSrc: Tympanic Core   SpO2: 98%   Weight: 70.5 kg (155 lb  "6.4 oz)   Height: 4' 10\" (1.473 m)         Physical Exam  Vitals reviewed.   Constitutional:       Appearance: She is well-developed.   HENT:      Head: Normocephalic and atraumatic.   Eyes:      Conjunctiva/sclera: Conjunctivae normal.      Pupils: Pupils are equal, round, and reactive to light.   Cardiovascular:      Rate and Rhythm: Normal rate and regular rhythm.      Heart sounds: Normal heart sounds, S1 normal and S2 normal. No murmur heard.  Pulmonary:      Effort: Pulmonary effort is normal. No respiratory distress.      Breath sounds: Normal breath sounds. No wheezing.   Musculoskeletal:         General: Normal range of motion.      Cervical back: Normal range of motion and neck supple.   Skin:     General: Skin is warm.   Neurological:      Mental Status: She is alert and oriented to person, place, and time.   Psychiatric:         Speech: Speech normal.         Behavior: Behavior normal.         Thought Content: Thought content normal.         Judgment: Judgment normal.                    Luly Rizo MD,   [facility]  1/16/2025      "

## 2025-02-06 ENCOUNTER — HOSPITAL ENCOUNTER (OUTPATIENT)
Dept: RADIOLOGY | Facility: HOSPITAL | Age: 70
Discharge: HOME | End: 2025-02-06
Attending: FAMILY MEDICINE
Payer: COMMERCIAL

## 2025-02-06 VITALS — HEIGHT: 58 IN | WEIGHT: 150 LBS | BODY MASS INDEX: 31.49 KG/M2

## 2025-02-06 DIAGNOSIS — Z12.31 ENCOUNTER FOR SCREENING MAMMOGRAM FOR BREAST CANCER: ICD-10-CM

## 2025-02-06 PROCEDURE — 77063 BREAST TOMOSYNTHESIS BI: CPT

## 2025-02-06 PROCEDURE — 77067 SCR MAMMO BI INCL CAD: CPT

## 2025-02-10 ENCOUNTER — RESULTS FOLLOW-UP (OUTPATIENT)
Dept: FAMILY MEDICINE CLINIC | Facility: CLINIC | Age: 70
End: 2025-02-10

## 2025-05-14 DIAGNOSIS — M54.16 LUMBAR RADICULOPATHY: Primary | ICD-10-CM

## 2025-05-14 RX ORDER — CYCLOBENZAPRINE HCL 10 MG
10 TABLET ORAL
Qty: 30 TABLET | Refills: 0 | Status: SHIPPED | OUTPATIENT
Start: 2025-05-14

## 2025-07-23 ENCOUNTER — OFFICE VISIT (OUTPATIENT)
Dept: FAMILY MEDICINE CLINIC | Facility: CLINIC | Age: 70
End: 2025-07-23
Payer: MEDICARE

## 2025-07-23 VITALS
SYSTOLIC BLOOD PRESSURE: 110 MMHG | HEART RATE: 85 BPM | OXYGEN SATURATION: 98 % | TEMPERATURE: 97.6 F | DIASTOLIC BLOOD PRESSURE: 70 MMHG | RESPIRATION RATE: 16 BRPM | WEIGHT: 152 LBS | HEIGHT: 55 IN | BODY MASS INDEX: 35.18 KG/M2

## 2025-07-23 DIAGNOSIS — R53.83 OTHER FATIGUE: ICD-10-CM

## 2025-07-23 DIAGNOSIS — E53.8 B12 DEFICIENCY: ICD-10-CM

## 2025-07-23 DIAGNOSIS — M54.16 LUMBAR RADICULOPATHY: ICD-10-CM

## 2025-07-23 DIAGNOSIS — M25.50 PAIN IN JOINTS: ICD-10-CM

## 2025-07-23 DIAGNOSIS — E78.5 HYPERLIPIDEMIA, UNSPECIFIED HYPERLIPIDEMIA TYPE: ICD-10-CM

## 2025-07-23 DIAGNOSIS — Z00.00 MEDICARE ANNUAL WELLNESS VISIT, SUBSEQUENT: Primary | ICD-10-CM

## 2025-07-23 DIAGNOSIS — Z23 ENCOUNTER FOR IMMUNIZATION: ICD-10-CM

## 2025-07-23 DIAGNOSIS — Z13.29 SCREENING FOR THYROID DISORDER: ICD-10-CM

## 2025-07-23 DIAGNOSIS — Z01.84 IMMUNITY STATUS TESTING: ICD-10-CM

## 2025-07-23 DIAGNOSIS — E55.9 VITAMIN D DEFICIENCY: ICD-10-CM

## 2025-07-23 DIAGNOSIS — E61.1 IRON DEFICIENCY: ICD-10-CM

## 2025-07-23 PROCEDURE — 99173 VISUAL ACUITY SCREEN: CPT | Performed by: FAMILY MEDICINE

## 2025-07-23 PROCEDURE — G0402 INITIAL PREVENTIVE EXAM: HCPCS | Performed by: FAMILY MEDICINE

## 2025-07-23 PROCEDURE — G0009 ADMIN PNEUMOCOCCAL VACCINE: HCPCS

## 2025-07-23 PROCEDURE — 90677 PCV20 VACCINE IM: CPT

## 2025-07-23 RX ORDER — CYCLOBENZAPRINE HCL 10 MG
10 TABLET ORAL
Qty: 30 TABLET | Refills: 3 | Status: SHIPPED | OUTPATIENT
Start: 2025-07-23

## 2025-07-23 NOTE — PROGRESS NOTES
"Adult Annual Physical  Name: Heidi Alcala      : 1955      MRN: [id number]  Encounter Provider: Luly Rizo MD  Encounter Date: 2025   Encounter department: [facility]    :  Assessment & Plan  Medicare annual wellness visit, subsequent    Hyperlipidemia, unspecified hyperlipidemia type    Other fatigue    Screening for thyroid disorder    B12 deficiency    Vitamin D deficiency    Iron deficiency    Pain in joints    Immunity status testing    Encounter for immunization    Lumbar radiculopathy        Preventive Screenings:    - Cervical cancer screening: screening not indicated   - Breast cancer screening: screening up-to-date     Immunizations:  - Immunizations due: Prevnar 20         History of Present Illness     Adult Annual Physical:  Patient presents for annual physical.     Diet and Physical Activity:  - Diet/Nutrition: well balanced diet.  - Exercise: 3-4 times a week on average. gym    Depression Screening:  - PHQ-2 Score: 0    General Health:  - Sleep: sleeps poorly and 4-6 hours of sleep on average.  - Hearing: normal hearing right ear and normal hearing left ear.  - Vision: vision problems and wears glasses.  - Dental: regular dental visits.    /GYN Health:  - Follows with GYN: no.   - History of STDs: no    Advanced Care Planning:  - Has an advanced directive?: no    - Has a durable medical POA?: no    - ACP document given to patient?: no      Review of Systems      Objective   /70 (BP Location: Right arm, Patient Position: Sitting, Cuff Size: Standard)   Pulse 85   Temp 97.6 °F (36.4 °C) (Temporal)   Resp 16   Ht (!) 5.95\" (0.151 m)   Wt 68.9 kg (152 lb)   SpO2 98%   BMI 3018.65 kg/m²     Physical Exam    "

## 2025-07-23 NOTE — PATIENT INSTRUCTIONS
Medicare Preventive Visit Patient Instructions  Thank you for completing your Welcome to Medicare Visit or Medicare Annual Wellness Visit today. Your next wellness visit will be due in one year (7/24/2026).  The screening/preventive services that you may require over the next 5-10 years are detailed below. Some tests may not apply to you based off risk factors and/or age. Screening tests ordered at today's visit but not completed yet may show as past due. Also, please note that scanned in results may not display below.  Preventive Screenings:  Service Recommendations Previous Testing/Comments   Colorectal Cancer Screening  * Colonoscopy    * Fecal Occult Blood Test (FOBT)/Fecal Immunochemical Test (FIT)  * Fecal DNA/Cologuard Test  * Flexible Sigmoidoscopy Age: 45-75 years old   Colonoscopy: every 10 years (may be performed more frequently if at higher risk)  OR  FOBT/FIT: every 1 year  OR  Cologuard: every 3 years  OR  Sigmoidoscopy: every 5 years  Screening may be recommended earlier than age 45 if at higher risk for colorectal cancer. Also, an individualized decision between you and your healthcare provider will decide whether screening between the ages of 76-85 would be appropriate. Colonoscopy: 08/27/2020  FOBT/FIT: Not on file  Cologuard: Not on file  Sigmoidoscopy: Not on file    Screening Current     Breast Cancer Screening Age: 40+ years old  Frequency: every 1-2 years  Not required if history of left and right mastectomy Mammogram: 02/06/2025    Screening Current   Cervical Cancer Screening Between the ages of 21-29, pap smear recommended once every 3 years.   Between the ages of 30-65, can perform pap smear with HPV co-testing every 5 years.   Recommendations may differ for women with a history of total hysterectomy, cervical cancer, or abnormal pap smears in past. Pap Smear: Not on file    Screening Not Indicated   Hepatitis C Screening Once for adults born between 1945 and 1965  More frequently in  patients at high risk for Hepatitis C Hep C Antibody: 07/22/2024    Screening Current   Diabetes Screening 1-2 times per year if you're at risk for diabetes or have pre-diabetes Fasting glucose: No results in last 5 years (No results in last 5 years)  A1C: No results in last 5 years (No results in last 5 years)  Screening Current   Cholesterol Screening Once every 5 years if you don't have a lipid disorder. May order more often based on risk factors. Lipid panel: 07/22/2024    Screening Not Indicated  History Lipid Disorder     Other Preventive Screenings Covered by Medicare:  Abdominal Aortic Aneurysm (AAA) Screening: covered once if your at risk. You're considered to be at risk if you have a family history of AAA.  Lung Cancer Screening: covers low dose CT scan once per year if you meet all of the following conditions: (1) Age 55-77; (2) No signs or symptoms of lung cancer; (3) Current smoker or have quit smoking within the last 15 years; (4) You have a tobacco smoking history of at least 20 pack years (packs per day multiplied by number of years you smoked); (5) You get a written order from a healthcare provider.  Glaucoma Screening: covered annually if you're considered high risk: (1) You have diabetes OR (2) Family history of glaucoma OR (3)  aged 50 and older OR (4)  American aged 65 and older  Osteoporosis Screening: covered every 2 years if you meet one of the following conditions: (1) You're estrogen deficient and at risk for osteoporosis based off medical history and other findings; (2) Have a vertebral abnormality; (3) On glucocorticoid therapy for more than 3 months; (4) Have primary hyperparathyroidism; (5) On osteoporosis medications and need to assess response to drug therapy.   Last bone density test (DXA Scan): 03/23/2023.  HIV Screening: covered annually if you're between the age of 15-65. Also covered annually if you are younger than 15 and older than 65 with risk factors  for HIV infection. For pregnant patients, it is covered up to 3 times per pregnancy.    Immunizations:  Immunization Recommendations   Influenza Vaccine Annual influenza vaccination during flu season is recommended for all persons aged >= 6 months who do not have contraindications   Pneumococcal Vaccine   * Pneumococcal conjugate vaccine = PCV13 (Prevnar 13), PCV15 (Vaxneuvance), PCV20 (Prevnar 20)  * Pneumococcal polysaccharide vaccine = PPSV23 (Pneumovax) Adults 19-65 yo with certain risk factors or if 65+ yo  If never received any pneumonia vaccine: recommend Prevnar 20 (PCV20)  Give PCV20 if previously received 1 dose of PCV13 or PPSV23   Hepatitis B Vaccine 3 dose series if at intermediate or high risk (ex: diabetes, end stage renal disease, liver disease)   Respiratory syncytial virus (RSV) Vaccine - COVERED BY MEDICARE PART D  * RSVPreF3 (Arexvy) CDC recommends that adults 60 years of age and older may receive a single dose of RSV vaccine using shared clinical decision-making (SCDM)   Tetanus (Td) Vaccine - COST NOT COVERED BY MEDICARE PART B Following completion of primary series, a booster dose should be given every 10 years to maintain immunity against tetanus. Td may also be given as tetanus wound prophylaxis.   Tdap Vaccine - COST NOT COVERED BY MEDICARE PART B Recommended at least once for all adults. For pregnant patients, recommended with each pregnancy.   Shingles Vaccine (Shingrix) - COST NOT COVERED BY MEDICARE PART B  2 shot series recommended in those 19 years and older who have or will have weakened immune systems or those 50 years and older     Health Maintenance Due:      Topic Date Due   • Breast Cancer Screening: Mammogram  02/06/2026   • DXA SCAN  03/23/2028   • Colorectal Cancer Screening  08/27/2030   • Hepatitis C Screening  Completed     Immunizations Due:      Topic Date Due   • Pneumococcal Vaccine: 50+ Years (1 of 1 - PCV) Never done   • COVID-19 Vaccine (2 - 2024-25 season)  09/01/2024   • Influenza Vaccine (1) 09/01/2025     Advance Directives   What are advance directives?  Advance directives are legal documents that state your wishes and plans for medical care. These plans are made ahead of time in case you lose your ability to make decisions for yourself. Advance directives can apply to any medical decision, such as the treatments you want, and if you want to donate organs.   What are the types of advance directives?  There are many types of advance directives, and each state has rules about how to use them. You may choose a combination of any of the following:  Living will:  This is a written record of the treatment you want. You can also choose which treatments you do not want, which to limit, and which to stop at a certain time. This includes surgery, medicine, IV fluid, and tube feedings.   Durable power of  for healthcare (DPAHC):  This is a written record that states who you want to make healthcare choices for you when you are unable to make them for yourself. This person, called a proxy, is usually a family member or a friend. You may choose more than 1 proxy.  Do not resuscitate (DNR) order:  A DNR order is used in case your heart stops beating or you stop breathing. It is a request not to have certain forms of treatment, such as CPR. A DNR order may be included in other types of advance directives.  Medical directive:  This covers the care that you want if you are in a coma, near death, or unable to make decisions for yourself. You can list the treatments you want for each condition. Treatment may include pain medicine, surgery, blood transfusions, dialysis, IV or tube feedings, and a ventilator (breathing machine).  Values history:  This document has questions about your views, beliefs, and how you feel and think about life. This information can help others choose the care that you would choose.  Why are advance directives important?  An advance directive helps you  control your care. Although spoken wishes may be used, it is better to have your wishes written down. Spoken wishes can be misunderstood, or not followed. Treatments may be given even if you do not want them. An advance directive may make it easier for your family to make difficult choices about your care.   Weight Management   Why it is important to manage your weight:  Being overweight increases your risk of health conditions such as heart disease, high blood pressure, type 2 diabetes, and certain types of cancer. It can also increase your risk for osteoarthritis, sleep apnea, and other respiratory problems. Aim for a slow, steady weight loss. Even a small amount of weight loss can lower your risk of health problems.  How to lose weight safely:  A safe and healthy way to lose weight is to eat fewer calories and get regular exercise. You can lose up about 1 pound a week by decreasing the number of calories you eat by 500 calories each day.   Healthy meal plan for weight management:  A healthy meal plan includes a variety of foods, contains fewer calories, and helps you stay healthy. A healthy meal plan includes the following:  Eat whole-grain foods more often.  A healthy meal plan should contain fiber. Fiber is the part of grains, fruits, and vegetables that is not broken down by your body. Whole-grain foods are healthy and provide extra fiber in your diet. Some examples of whole-grain foods are whole-wheat breads and pastas, oatmeal, brown rice, and bulgur.  Eat a variety of vegetables every day.  Include dark, leafy greens such as spinach, kale, prashant greens, and mustard greens. Eat yellow and orange vegetables such as carrots, sweet potatoes, and winter squash.   Eat a variety of fruits every day.  Choose fresh or canned fruit (canned in its own juice or light syrup) instead of juice. Fruit juice has very little or no fiber.  Eat low-fat dairy foods.  Drink fat-free (skim) milk or 1% milk. Eat fat-free yogurt  and low-fat cottage cheese. Try low-fat cheeses such as mozzarella and other reduced-fat cheeses.  Choose meat and other protein foods that are low in fat.  Choose beans or other legumes such as split peas or lentils. Choose fish, skinless poultry (chicken or turkey), or lean cuts of red meat (beef or pork). Before you cook meat or poultry, cut off any visible fat.   Use less fat and oil.  Try baking foods instead of frying them. Add less fat, such as margarine, sour cream, regular salad dressing and mayonnaise to foods. Eat fewer high-fat foods. Some examples of high-fat foods include french fries, doughnuts, ice cream, and cakes.  Eat fewer sweets.  Limit foods and drinks that are high in sugar. This includes candy, cookies, regular soda, and sweetened drinks.  Exercise:  Exercise at least 30 minutes per day on most days of the week. Some examples of exercise include walking, biking, dancing, and swimming. You can also fit in more physical activity by taking the stairs instead of the elevator or parking farther away from stores. Ask your healthcare provider about the best exercise plan for you.    © Copyright [employer] 2018 Information is for End User's use only and may not be sold, redistributed or otherwise used for commercial purposes. All illustrations and images included in CareNotes® are the copyrighted property of [employer]M., Inc. or [employer]

## 2025-07-23 NOTE — PROGRESS NOTES
Name: Heidi Alcala      : 1955      MRN: [id number]  Encounter Provider: Luly Rizo MD  Encounter Date: 2025   Encounter department: [facility]  :  Assessment & Plan  Medicare annual wellness visit, subsequent         Hyperlipidemia, unspecified hyperlipidemia type  Repeat levels  Orders:  •  Comprehensive metabolic panel; Future  •  Lipid panel; Future    Other fatigue    Orders:  •  CBC and differential; Future    Screening for thyroid disorder    Orders:  •  TSH, 3rd generation with Free T4 reflex; Future    B12 deficiency    Orders:  •  Vitamin B12; Future    Vitamin D deficiency    Orders:  •  Vitamin D 25 hydroxy; Future    Iron deficiency    Orders:  •  Iron Panel (Includes Ferritin, Iron Sat%, Iron, and TIBC); Future    Pain in joints    Orders:  •  Cyclic citrul peptide antibody, IgG; Future  •  RHEUMATOID FACTOR; Future    Immunity status testing  Wants to check her immunity.  Recommend check in the insurance to be sure it is covered  Orders:  •  Rabies antibody titer; Future    Encounter for immunization    Orders:  •  Pneumococcal Conjugate Vaccine 20-valent (Pcv20)    Lumbar radiculopathy  Sending in for the patient to have Flexeril to be on hand as needed  Orders:  •  cyclobenzaprine (FLEXERIL) 10 mg tablet; Take 1 tablet (10 mg total) by mouth daily at bedtime as needed for muscle spasms       Preventive health issues were discussed with patient, and age appropriate screening tests were ordered as noted in patient's After Visit Summary. Personalized health advice and appropriate referrals for health education or preventive services given if needed, as noted in patient's After Visit Summary.    History of Present Illness     HPI   Patient sleeps on a recliner every day given her lower back that she is not able to lay down flat.  Patient states that she did get relief from the injections but only 3 weeks and did not feel like it was worth it.   Continues to have joint pains and fatigue.  Patient Care Team:  Luly Rizo MD as PCP - General (Family Medicine)  Kaz Daniel MD as Endoscopist    Review of Systems   Constitutional:  Negative for activity change, appetite change, chills, fatigue and fever.   HENT:  Negative for congestion.    Respiratory:  Negative for cough, chest tightness and shortness of breath.    Cardiovascular:  Negative for chest pain and leg swelling.   Gastrointestinal:  Negative for abdominal distention, abdominal pain, constipation, diarrhea, nausea and vomiting.   Musculoskeletal:  Positive for arthralgias.   All other systems reviewed and are negative.    Medical History Reviewed by provider this encounter:       Annual Wellness Visit Questionnaire   Heidi is here for her Subsequent Wellness visit.     Health Risk Assessment:   Patient rates overall health as very good. Patient feels that their physical health rating is same. Patient is satisfied with their life. Eyesight was rated as same. Hearing was rated as same. Patient feels that their emotional and mental health rating is same. Patients states they are never, rarely angry. Patient states they are never, rarely unusually tired/fatigued. Pain experienced in the last 7 days has been a lot. Patient's pain rating has been 5/10. Patient states that she has experienced no weight loss or gain in last 6 months. Back  and hands     Depression Screening:   PHQ-2 Score: 0      Fall Risk Screening:   In the past year, patient has experienced: no history of falling in past year      Urinary Incontinence Screening:   Patient has not leaked urine accidently in the last six months.     Home Safety:  Patient does not have trouble with stairs inside or outside of their home. Patient has working smoke alarms and has working carbon monoxide detector.     Nutrition:   Current diet is Regular.     Medications:   Patient is currently taking over-the-counter supplements. OTC  medications include: see medication list. Patient is able to manage medications.     Activities of Daily Living (ADLs)/Instrumental Activities of Daily Living (IADLs):   Walk and transfer into and out of bed and chair?: Yes  Dress and groom yourself?: Yes    Bathe or shower yourself?: Yes    Feed yourself? Yes  Do your laundry/housekeeping?: Yes  Manage your money, pay your bills and track your expenses?: Yes  Make your own meals?: Yes    Do your own shopping?: Yes    Previous Hospitalizations:   Any hospitalizations or ED visits within the last 12 months?: No      Advance Care Planning:   Living will: No    Durable POA for healthcare: No    Advanced directive: No      Cognitive Screening:   Provider or family/friend/caregiver concerned regarding cognition?: No    Preventive Screenings      Cardiovascular Screening:    General: Screening Not Indicated and History Lipid Disorder      Diabetes Screening:     General: Screening Current      Colorectal Cancer Screening:     General: Screening Current      Breast Cancer Screening:     General: Screening Current      Cervical Cancer Screening:    General: Screening Not Indicated      Osteoporosis Screening:    General: Screening Current      Lung Cancer Screening:     General: Screening Not Indicated      Hepatitis C Screening:    General: Screening Current    Screening, Brief Intervention, and Referral to Treatment (SBIRT)     Screening  Typical number of drinks in a day: 2  Typical number of drinks in a week: 14  Interpretation: Low risk drinking behavior.    AUDIT-C Screenin) How often did you have a drink containing alcohol in the past year? never  2) How many drinks did you have on a typical day when you were drinking in the past year? 0  3) How often did you have 6 or more drinks on one occasion in the past year? never    AUDIT-C Score: 0  Interpretation: Score 0-2 (female): Negative screen for alcohol misuse    Single Item Drug Screening:  How often have you  "used an illegal drug (including marijuana) or a prescription medication for non-medical reasons in the past year? never    Single Item Drug Screen Score: 0  Interpretation: Negative screen for possible drug use disorder    Social Drivers of Health     Food Insecurity: No Food Insecurity (7/23/2025)    Nursing - Inadequate Food Risk Classification    • Worried About Running Out of Food in the Last Year: Never true    • Ran Out of Food in the Last Year: Never true   Transportation Needs: No Transportation Needs (7/23/2025)    PRAPARE - Transportation    • Lack of Transportation (Medical): No    • Lack of Transportation (Non-Medical): No   Housing Stability: Low Risk  (7/23/2025)    Housing Stability Vital Sign    • Unable to Pay for Housing in the Last Year: No    • Number of Times Moved in the Last Year: 0    • Homeless in the Last Year: No   Utilities: Not At Risk (7/23/2025)    [facility] Utilities    • Threatened with loss of utilities: No     Vision Screening    Right eye Left eye Both eyes   Without correction      With correction 20/25 20/25 20/20       Objective   /70 (BP Location: Right arm, Patient Position: Sitting, Cuff Size: Standard)   Pulse 85   Temp 97.6 °F (36.4 °C) (Temporal)   Resp 16   Ht (!) 5.95\" (0.151 m)   Wt 68.9 kg (152 lb)   SpO2 98%   BMI 3018.65 kg/m²     Physical Exam  Vitals reviewed.   Constitutional:       Appearance: Normal appearance. She is well-developed.   HENT:      Head: Normocephalic and atraumatic.      Right Ear: Tympanic membrane, ear canal and external ear normal. There is no impacted cerumen.      Left Ear: Tympanic membrane, ear canal and external ear normal. There is no impacted cerumen.      Nose: Nose normal.      Mouth/Throat:      Mouth: Mucous membranes are moist.      Pharynx: Oropharynx is clear.     Eyes:      Conjunctiva/sclera: Conjunctivae normal.      Pupils: Pupils are equal, round, and reactive to light.       Cardiovascular:      Rate and Rhythm: " Normal rate and regular rhythm.      Heart sounds: Normal heart sounds.   Pulmonary:      Effort: Pulmonary effort is normal.      Breath sounds: Normal breath sounds.   Abdominal:      General: Abdomen is flat. Bowel sounds are normal.      Palpations: Abdomen is soft.     Musculoskeletal:         General: Normal range of motion.      Cervical back: Normal range of motion and neck supple.      Comments: Left creptious on s/p knee 2007     Skin:     General: Skin is warm.      Capillary Refill: Capillary refill takes less than 2 seconds.     Neurological:      General: No focal deficit present.      Mental Status: She is alert and oriented to person, place, and time. Mental status is at baseline.     Psychiatric:         Mood and Affect: Mood normal.         Behavior: Behavior normal.         Thought Content: Thought content normal.         Judgment: Judgment normal.

## 2025-07-24 ENCOUNTER — TELEPHONE (OUTPATIENT)
Age: 70
End: 2025-07-24

## 2025-07-24 NOTE — TELEPHONE ENCOUNTER
PA for (FLEXERIL) 10 mgSUBMITTED to medicare     via      [x][employer]-Case ID # [id number]       [x]PA sent as URGENT    All office notes, labs and other pertaining documents and studies sent. Clinical questions answered. Awaiting determination from insurance company.     Turnaround time for your insurance to make a decision on your Prior Authorization can take 7-21 business days.

## 2025-07-25 NOTE — TELEPHONE ENCOUNTER
PA for (FLEXERIL) 10 mg  DENIED    Reason:(Screenshot if applicable)        Message sent to office clinical pool Yes    Denial letter scanned into Media Yes    We can gladly do an appeal but the process can take about 30-60 days to provide determination. Please have the office staff schedule a Peer to Peer at phone [phone] . If an appeal is truly warranted please have Provider send clinical documentation to the PA department to support the appeal.     **Please follow up with your patient regarding denial and next steps**

## (undated) DEVICE — 1200CC GUARDIAN II: Brand: GUARDIAN

## (undated) DEVICE — Device: Brand: PORTEX

## (undated) DEVICE — SUT MONOCRYL 4-0 PS-2 27 IN Y426H

## (undated) DEVICE — DISPOSABLE BIOPSY VALVE MAJ-1555: Brand: SINGLE USE BIOPSY VALVE (STERILE)

## (undated) DEVICE — ADHESIVE SKIN HIGH VISCOSITY EXOFIN 1ML

## (undated) DEVICE — PLASTIC ADHESIVE BANDAGE: Brand: CURITY

## (undated) DEVICE — GLOVE INDICATOR PI UNDERGLOVE SZ 7.5 BLUE

## (undated) DEVICE — CHLORAPREP HI-LITE 26ML ORANGE

## (undated) DEVICE — BITE BLOCK ENDO 60FR ADLT MAXI  DISP W/STRAP

## (undated) DEVICE — GAUZE SPONGES,16 PLY: Brand: CURITY

## (undated) DEVICE — SYRINGE 5ML LL

## (undated) DEVICE — SOLIDIFIER FLUID WASTE CONTROL 1500ML

## (undated) DEVICE — NEEDLE SPINAL 22G X 3.5IN  QUINCKE

## (undated) DEVICE — BASIC DOUBLE BASIN 2-LF: Brand: MEDLINE INDUSTRIES, INC.

## (undated) DEVICE — GLOVE EXAM NON-STRL NTRL PLUS LRG PF

## (undated) DEVICE — PACK GENERAL LF

## (undated) DEVICE — INTENDED FOR TISSUE SEPARATION, AND OTHER PROCEDURES THAT REQUIRE A SHARP SURGICAL BLADE TO PUNCTURE OR CUT.: Brand: BARD-PARKER SAFETY BLADES SIZE 11, STERILE

## (undated) DEVICE — RADIOLOGY STERILE LABELS: Brand: CENTURION

## (undated) DEVICE — Device

## (undated) DEVICE — TISSUE RETRIEVAL SYSTEM: Brand: INZII RETRIEVAL SYSTEM

## (undated) DEVICE — IV SET EXT SM BORE CARESITE 8IN

## (undated) DEVICE — DRAPE UTILITY

## (undated) DEVICE — PDS II VLT 0 107CM AG ST3: Brand: ENDOLOOP

## (undated) DEVICE — ADHESIVE SKN CLSR HISTOACRYL FLEX 0.5ML LF

## (undated) DEVICE — LUBRICANT SURGILUBE TUBE 4 OZ  FLIP TOP

## (undated) DEVICE — "MB-142 MOUTHPIECE": Brand: MOUTHPIECE

## (undated) DEVICE — IRRIG ENDO FLO TUBING

## (undated) DEVICE — AIRLIFE™  ADULT CUSHION NASAL CANNULA WITH 7 FOOT (2.1 M) CRUSH-RESISTANT OXYGEN TUBING, AND U/CONNECT-IT ADAPTER: Brand: AIRLIFE™

## (undated) DEVICE — BRUSH ENDO CLEANING DBL-HEADER

## (undated) DEVICE — "MH-443 SUCTION VALVE F/EVIS140 EVIS160": Brand: SUCTION VALVE

## (undated) DEVICE — TIBURON GENERAL ENDOSCOPY DRAPE: Brand: CONVERTORS

## (undated) DEVICE — SUT VICRYL 0 UR-6 27 IN J603H

## (undated) DEVICE — TOWEL SET X-RAY

## (undated) DEVICE — SINGLE-USE BIOPSY FORCEPS: Brand: RADIAL JAW 4

## (undated) DEVICE — GLOVE SRG BIOGEL 8

## (undated) DEVICE — TROCAR: Brand: KII FIOS FIRST ENTRY

## (undated) DEVICE — GLOVE SRG LF STRL BGL SKNSNS 7.5 PF

## (undated) DEVICE — CHLORAPREP APPLICATOR TINTED 10.5ML ONE-STEP

## (undated) DEVICE — "MAJ-901 WATER CONTAINER SET CV-160/140": Brand: WATER CONTAINER

## (undated) DEVICE — TUBING AUX CHANNEL

## (undated) DEVICE — TROCAR: Brand: KII® SLEEVE

## (undated) DEVICE — INSUFFLATION NEEDLE TO ESTABLISH PNEUMOPERITONEUM.: Brand: INSUFFLATION NEEDLE

## (undated) DEVICE — HARMONIC 1100 SHEARS, 36CM SHAFT LENGTH: Brand: HARMONIC

## (undated) DEVICE — TUBING SMOKE EVAC W/FILTRATION DEVICE PLUMEPORT ACTIV

## (undated) DEVICE — "MH-438 A/W VLVE F/140 EVIS-140": Brand: AIR/WATER VALVE

## (undated) DEVICE — TUBING BUBBLE CLEAR 5MM X 100 FT NS

## (undated) DEVICE — SYRINGE 10ML LL

## (undated) DEVICE — MEDI-VAC YANKAUER SUCTION HANDLE: Brand: CARDINAL HEALTH